# Patient Record
Sex: FEMALE | Race: WHITE | Employment: UNEMPLOYED | URBAN - METROPOLITAN AREA
[De-identification: names, ages, dates, MRNs, and addresses within clinical notes are randomized per-mention and may not be internally consistent; named-entity substitution may affect disease eponyms.]

---

## 2017-04-01 ENCOUNTER — HOSPITAL ENCOUNTER (OUTPATIENT)
Dept: MAMMOGRAPHY | Age: 62
Discharge: HOME | End: 2017-04-01
Attending: INTERNAL MEDICINE
Payer: COMMERCIAL

## 2017-04-01 DIAGNOSIS — Z12.31 ENCOUNTER FOR SCREENING MAMMOGRAM FOR BREAST CANCER: ICD-10-CM

## 2017-04-01 PROCEDURE — 77067 SCR MAMMO BI INCL CAD: CPT

## 2018-06-09 ENCOUNTER — HOSPITAL ENCOUNTER (OUTPATIENT)
Dept: MAMMOGRAPHY | Age: 63
Discharge: HOME | End: 2018-06-09
Attending: INTERNAL MEDICINE
Payer: COMMERCIAL

## 2018-06-09 DIAGNOSIS — Z12.31 VISIT FOR SCREENING MAMMOGRAM: ICD-10-CM

## 2018-06-09 PROCEDURE — 77067 SCR MAMMO BI INCL CAD: CPT

## 2019-07-16 ENCOUNTER — HOSPITAL ENCOUNTER (OUTPATIENT)
Dept: MAMMOGRAPHY | Age: 64
Discharge: HOME | End: 2019-07-16
Attending: INTERNAL MEDICINE
Payer: COMMERCIAL

## 2019-07-16 DIAGNOSIS — Z12.39 BREAST SCREENING, UNSPECIFIED: ICD-10-CM

## 2019-07-16 PROCEDURE — 77063 BREAST TOMOSYNTHESIS BI: CPT

## 2019-09-25 ENCOUNTER — HOSPITAL ENCOUNTER (OUTPATIENT)
Dept: LAB | Age: 64
Discharge: HOME | End: 2019-09-25
Payer: COMMERCIAL

## 2019-09-25 LAB — TSH SERPL DL<=0.005 MIU/L-ACNC: 3.07 UIU/ML (ref 0.36–3.74)

## 2019-09-25 PROCEDURE — 84443 ASSAY THYROID STIM HORMONE: CPT

## 2019-10-02 PROBLEM — E66.01 OBESITY, MORBID (HCC): Status: ACTIVE | Noted: 2019-10-02

## 2019-11-05 ENCOUNTER — ANESTHESIA (OUTPATIENT)
Dept: ENDOSCOPY | Age: 64
End: 2019-11-05
Payer: COMMERCIAL

## 2019-11-05 ENCOUNTER — ANESTHESIA EVENT (OUTPATIENT)
Dept: ENDOSCOPY | Age: 64
End: 2019-11-05
Payer: COMMERCIAL

## 2019-11-05 ENCOUNTER — HOSPITAL ENCOUNTER (OUTPATIENT)
Age: 64
Setting detail: OUTPATIENT SURGERY
Discharge: HOME | End: 2019-11-05
Attending: INTERNAL MEDICINE | Admitting: INTERNAL MEDICINE
Payer: COMMERCIAL

## 2019-11-05 VITALS
BODY MASS INDEX: 50.03 KG/M2 | HEART RATE: 58 BPM | TEMPERATURE: 97 F | SYSTOLIC BLOOD PRESSURE: 146 MMHG | OXYGEN SATURATION: 99 % | HEIGHT: 60 IN | DIASTOLIC BLOOD PRESSURE: 64 MMHG | WEIGHT: 254.8 LBS | RESPIRATION RATE: 16 BRPM

## 2019-11-05 PROCEDURE — 76060000032 HC ANESTHESIA 0.5 TO 1 HR: Performed by: INTERNAL MEDICINE

## 2019-11-05 PROCEDURE — 77030013991 HC SNR POLYP ENDOSC BSC -A: Performed by: INTERNAL MEDICINE

## 2019-11-05 PROCEDURE — 88305 TISSUE EXAM BY PATHOLOGIST: CPT

## 2019-11-05 PROCEDURE — 76040000025: Performed by: INTERNAL MEDICINE

## 2019-11-05 PROCEDURE — 74011000250 HC RX REV CODE- 250: Performed by: NURSE ANESTHETIST, CERTIFIED REGISTERED

## 2019-11-05 PROCEDURE — 77030010936 HC CLP LIG BSC -C: Performed by: INTERNAL MEDICINE

## 2019-11-05 PROCEDURE — 74011250636 HC RX REV CODE- 250/636: Performed by: INTERNAL MEDICINE

## 2019-11-05 PROCEDURE — 74011250636 HC RX REV CODE- 250/636: Performed by: NURSE ANESTHETIST, CERTIFIED REGISTERED

## 2019-11-05 RX ORDER — SODIUM CHLORIDE 0.9 % (FLUSH) 0.9 %
5-40 SYRINGE (ML) INJECTION EVERY 8 HOURS
Status: DISCONTINUED | OUTPATIENT
Start: 2019-11-05 | End: 2019-11-05 | Stop reason: HOSPADM

## 2019-11-05 RX ORDER — SODIUM CHLORIDE 0.9 % (FLUSH) 0.9 %
5-40 SYRINGE (ML) INJECTION AS NEEDED
Status: DISCONTINUED | OUTPATIENT
Start: 2019-11-05 | End: 2019-11-05 | Stop reason: HOSPADM

## 2019-11-05 RX ORDER — LIDOCAINE HYDROCHLORIDE 20 MG/ML
INJECTION, SOLUTION EPIDURAL; INFILTRATION; INTRACAUDAL; PERINEURAL AS NEEDED
Status: DISCONTINUED | OUTPATIENT
Start: 2019-11-05 | End: 2019-11-05 | Stop reason: HOSPADM

## 2019-11-05 RX ORDER — PROPOFOL 10 MG/ML
INJECTION, EMULSION INTRAVENOUS AS NEEDED
Status: DISCONTINUED | OUTPATIENT
Start: 2019-11-05 | End: 2019-11-05 | Stop reason: HOSPADM

## 2019-11-05 RX ORDER — SODIUM CHLORIDE, SODIUM LACTATE, POTASSIUM CHLORIDE, CALCIUM CHLORIDE 600; 310; 30; 20 MG/100ML; MG/100ML; MG/100ML; MG/100ML
1000 INJECTION, SOLUTION INTRAVENOUS CONTINUOUS
Status: DISCONTINUED | OUTPATIENT
Start: 2019-11-05 | End: 2019-11-05 | Stop reason: HOSPADM

## 2019-11-05 RX ORDER — PROPOFOL 10 MG/ML
INJECTION, EMULSION INTRAVENOUS
Status: DISCONTINUED | OUTPATIENT
Start: 2019-11-05 | End: 2019-11-05 | Stop reason: HOSPADM

## 2019-11-05 RX ORDER — LIDOCAINE HCL/PF 100 MG/5ML
SYRINGE (ML) INTRAVENOUS
Status: DISCONTINUED
Start: 2019-11-05 | End: 2019-11-05 | Stop reason: HOSPADM

## 2019-11-05 RX ADMIN — PROPOFOL 200 MCG/KG/MIN: 10 INJECTION, EMULSION INTRAVENOUS at 13:44

## 2019-11-05 RX ADMIN — LIDOCAINE HYDROCHLORIDE 100 MG: 20 INJECTION, SOLUTION EPIDURAL; INFILTRATION; INTRACAUDAL; PERINEURAL at 13:44

## 2019-11-05 RX ADMIN — PROPOFOL 70 MG: 10 INJECTION, EMULSION INTRAVENOUS at 13:44

## 2019-11-05 RX ADMIN — PROPOFOL 20 MG: 10 INJECTION, EMULSION INTRAVENOUS at 13:48

## 2019-11-05 RX ADMIN — SODIUM CHLORIDE, SODIUM LACTATE, POTASSIUM CHLORIDE, AND CALCIUM CHLORIDE 1000 ML: 600; 310; 30; 20 INJECTION, SOLUTION INTRAVENOUS at 13:35

## 2019-11-05 NOTE — PROCEDURES
COLONOSCOPY    DATE of PROCEDURE: 11/5/2019    MEDICATION:  MAC      INSTRUMENT: PCF    PROCEDURE: After obtaining informed consent, the patient was placed in the left lateral position and sedated. The endoscope was advanced to the cecum where the appendiceal orifice and ileocecal valve were identified. On withdrawal, the colon was carefully visualized in a 360 degree fashion. Retroflexion was performed in the rectum. The patient was taken to the recovery area in stable condition. FINDINGS:  Anus: Unremarkable  Rectum: Internal hemorrhoids, otherwise unremarkable  Sigmoid: Unremarkable  Descending Colon:  Unremarkable  Transverse Colon: Single ~7mm polyp on fold, removed with hot snare. Resection and retrieval were complete. Small mucosal defect noted, closed with resolution clip. Otherwise, unremarkable  Ascending Colon: Unremarkable  Cecum: Unremarkable  Terminal ileum: Unremarkable    Estimated blood loss: 0-minimal     ASSESSMENT/PLAN:  1. Repeat exam in 3 years  2. Follow up in office prn  3. Call office in 10 days for pathology report.        Gayl Nyhan, MD  [facility], [place]

## 2019-11-05 NOTE — DISCHARGE INSTRUCTIONS
Gastrointestinal Colonoscopy/Flexible Sigmoidoscopy - Lower Exam Discharge Instructions  1. Call Dr. Timothy Young at [phone] for any problems or questions. 2. Contact the doctors office for follow up appointment as directed  3. Medication may cause drowsiness for several hours, therefore:  · Do not drive or operate machinery for reminder of the day. · No alcohol today. · Do not make any important or legal decisions for 24 hours. · Do not sign any legal documents for 24 hours. 4. Ordinarily, you may resume regular diet and activity after exam unless otherwise specified by your physician. 5. Because of air put into your colon during exam, you may experience some abdominal distension, relieved by the passage of gas, for several hours. 6. Contact your physician if you have any of the following:  · Excessive amount of bleeding - large amount when having a bowel movement. Occasional specks of blood with bowel movement would not be unusual.  · Severe abdominal pain  · Fever or Chills  7. Polyp Removal - follow these additional instructions  · Take Metamucil - 1 tablespoon in juice every morning for 3 days as needed to avoid constipation. · No Aspirin, Advil, Aleve, Nuprin, Ibuprofen, or medications that contain these drugs for 2 weeks. Any additional instructions:  Repeat colonoscopy in 3 years. Follow up on pathology. Instructions given to [address] and other family members.

## 2019-11-05 NOTE — ANESTHESIA POSTPROCEDURE EVALUATION
Procedure(s):  COLONOSCOPY  ENDOSCOPIC POLYPECTOMY  ENDOSCOPIC BANDING OR LIGATION. total IV anesthesia    Anesthesia Post Evaluation      Multimodal analgesia: multimodal analgesia not used between 6 hours prior to anesthesia start to PACU discharge  Patient location during evaluation: PACU  Patient participation: complete - patient participated  Level of consciousness: awake and alert  Pain management: adequate  Airway patency: patent  Anesthetic complications: no  Cardiovascular status: hemodynamically stable  Respiratory status: acceptable  Hydration status: acceptable        Vitals Value Taken Time   /64 11/5/2019  2:40 PM   Temp 36.1 °C (97 °F) 11/5/2019  2:11 PM   Pulse 64 11/5/2019  2:42 PM   Resp 16 11/5/2019  2:40 PM   SpO2 100 % 11/5/2019  2:42 PM   Vitals shown include unvalidated device data.

## 2019-11-05 NOTE — ROUTINE PROCESS
VSS. No complaints noted at this time. Education given and reviewed with . Pt discharged via wheelchair by Bob Montiel RN.

## 2019-11-05 NOTE — ANESTHESIA PREPROCEDURE EVALUATION
Anesthetic History               Review of Systems / Medical History      Pulmonary            Asthma        Neuro/Psych         Psychiatric history     Cardiovascular    Hypertension: well controlled                   GI/Hepatic/Renal     GERD: well controlled           Endo/Other        Obesity and arthritis     Other Findings              Physical Exam    Airway  Mallampati: II  TM Distance: > 6 cm  Neck ROM: normal range of motion   Mouth opening: Normal     Cardiovascular  Regular rate and rhythm,  S1 and S2 normal,  no murmur, click, rub, or gallop  Rhythm: regular  Rate: normal         Dental  No notable dental hx       Pulmonary  Breath sounds clear to auscultation               Abdominal         Other Findings            Anesthetic Plan    ASA: 3  Anesthesia type: total IV anesthesia            Anesthetic plan and risks discussed with: Patient

## 2019-11-05 NOTE — H&P
History and Physical for Colonoscopy             Date: 2019     History of Present Illness:  Patient presents to undergo colonoscopy for history of polyps as well as family history of colon cancer.         Past Medical History:   Diagnosis Date    Arthritis     osteo    Asthma     Back pain 12/3/2012    Claustrophobia     Depression with anxiety     under control with med    Elevated liver function tests 2015    GERD (gastroesophageal reflux disease)     controlled with medication    Hyperlipidemia 7/6/15    no meds- resolved presently -     Hypertension     controlled with medication    Hypothyroidism     controlled with Synthroid    Hypothyroidism, adult 2015    Menopause     Morbid obesity ([address] 7/6/15    BMI- 40    Oral ulcer 12/3/2012    Osteoarthritis 12/3/2012    Psychiatric disorder     depression, anxiety,     Sciatica 12/3/2012    Thyroid disease     hypothyroid    Vitamin D deficiency 2015     Past Surgical History:   Procedure Laterality Date    BIOPSY OF BREAST, INCISIONAL      right, benign    HX BREAST BIOPSY Right     BENIGN    HX  SECTION  1976, 1983    x 2    HX DILATION AND CURETTAGE  2019    HX ORTHOPAEDIC      right foot /Lt knee replace    HX ORTHOPAEDIC Left     rotator cuff    HX OTHER SURGICAL      neck lymph node removal, benign    HX OTHER SURGICAL      steroid injection       Family History   Problem Relation Age of Onset    Other Mother         Unspecified respiratory problems    Lung Disease Mother     Cancer Father         brain    Diabetes Maternal Grandmother     Cancer Maternal Grandmother         colon    Cancer Paternal Grandmother         colon     Social History     Tobacco Use    Smoking status: Never Smoker    Smokeless tobacco: Never Used   Substance Use Topics    Alcohol use: No        Allergies   Allergen Reactions    Latex Rash     3 episodes of mouth sores, after dental visit (latex gloves)    Singulair [Montelukast] Shortness of Breath     Wheezing    Adhesive Tape-Silicones Rash     Paper tape only    Celebrex [Celecoxib] Other (comments)     Fatigue    Pravastatin Swelling     \"I retained fluid\"     No current facility-administered medications for this encounter. Review of Systems:  A detailed 10 organ review of systems is obtained with pertinent positives as listed in the History of Present Illness. All others are negative. Objective:     Physical Exam:  Visit Vitals  LMP 06/27/2010      General:  Alert and oriented. Heart: Regular rate and rhythm  Lungs:  Clear to auscultation bilaterally  Abdomen: Soft, nontender, nondistended    Impression/Plan:     Proceed with colonoscopy as planned. I have discussed with the patient the technique, benefits, alternatives, and risks of these procedures, including medication reaction, immediate or delayed bleeding, or perforation of the gastrointestinal tract.      Signed By: Tracy Horta MD     November 5, 2019

## 2019-12-20 ENCOUNTER — HOSPITAL ENCOUNTER (OUTPATIENT)
Dept: GENERAL RADIOLOGY | Age: 64
Discharge: HOME | End: 2019-12-20
Attending: INTERNAL MEDICINE
Payer: COMMERCIAL

## 2019-12-20 DIAGNOSIS — R06.02 SOB (SHORTNESS OF BREATH): ICD-10-CM

## 2019-12-20 DIAGNOSIS — J45.909 UNCOMPLICATED ASTHMA, UNSPECIFIED ASTHMA SEVERITY, UNSPECIFIED WHETHER PERSISTENT: ICD-10-CM

## 2019-12-20 PROBLEM — J45.20 MILD INTERMITTENT ASTHMA WITHOUT COMPLICATION: Status: ACTIVE | Noted: 2019-12-20

## 2019-12-20 PROCEDURE — 71046 X-RAY EXAM CHEST 2 VIEWS: CPT

## 2020-06-14 RX ORDER — CEFAZOLIN SODIUM/WATER 2 G/20 ML
2 SYRINGE (ML) INTRAVENOUS ONCE
Status: CANCELLED | OUTPATIENT
Start: 2020-06-14 | End: 2020-06-14

## 2020-06-16 ENCOUNTER — HOSPITAL ENCOUNTER (OUTPATIENT)
Dept: PHYSICAL THERAPY | Age: 65
Discharge: HOME | End: 2020-06-16
Payer: COMMERCIAL

## 2020-06-16 ENCOUNTER — HOSPITAL ENCOUNTER (OUTPATIENT)
Dept: SURGERY | Age: 65
Discharge: HOME | End: 2020-06-16
Payer: COMMERCIAL

## 2020-06-16 VITALS
WEIGHT: 235 LBS | RESPIRATION RATE: 12 BRPM | TEMPERATURE: 98 F | OXYGEN SATURATION: 99 % | BODY MASS INDEX: 44.37 KG/M2 | HEART RATE: 63 BPM | DIASTOLIC BLOOD PRESSURE: 71 MMHG | HEIGHT: 61 IN | SYSTOLIC BLOOD PRESSURE: 127 MMHG

## 2020-06-16 LAB
ANION GAP SERPL CALC-SCNC: 6 MMOL/L (ref 7–16)
APTT PPP: 27.8 SEC (ref 24.3–35.4)
ATRIAL RATE: 61 BPM
BACTERIA SPEC CULT: NORMAL
BASOPHILS # BLD: 0.1 K/UL (ref 0–0.2)
BASOPHILS NFR BLD: 1 % (ref 0–2)
BUN SERPL-MCNC: 27 MG/DL (ref 8–23)
CALCIUM SERPL-MCNC: 8.9 MG/DL (ref 8.3–10.4)
CALCULATED P AXIS, ECG09: 48 DEGREES
CALCULATED R AXIS, ECG10: -3 DEGREES
CALCULATED T AXIS, ECG11: 23 DEGREES
CHLORIDE SERPL-SCNC: 105 MMOL/L (ref 98–107)
CO2 SERPL-SCNC: 28 MMOL/L (ref 21–32)
CREAT SERPL-MCNC: 1.08 MG/DL (ref 0.6–1)
DIAGNOSIS, 93000: NORMAL
DIFFERENTIAL METHOD BLD: ABNORMAL
EOSINOPHIL # BLD: 0.3 K/UL (ref 0–0.8)
EOSINOPHIL NFR BLD: 5 % (ref 0.5–7.8)
ERYTHROCYTE [DISTWIDTH] IN BLOOD BY AUTOMATED COUNT: 13.3 % (ref 11.9–14.6)
EST. AVERAGE GLUCOSE BLD GHB EST-MCNC: 114 MG/DL
GLUCOSE SERPL-MCNC: 99 MG/DL (ref 65–100)
HBA1C MFR BLD: 5.6 %
HCT VFR BLD AUTO: 38 % (ref 35.8–46.3)
HGB BLD-MCNC: 11.9 G/DL (ref 11.7–15.4)
IMM GRANULOCYTES # BLD AUTO: 0 K/UL (ref 0–0.5)
IMM GRANULOCYTES NFR BLD AUTO: 0 % (ref 0–5)
INR PPP: 1.1
LYMPHOCYTES # BLD: 1.8 K/UL (ref 0.5–4.6)
LYMPHOCYTES NFR BLD: 32 % (ref 13–44)
MCH RBC QN AUTO: 28.5 PG (ref 26.1–32.9)
MCHC RBC AUTO-ENTMCNC: 31.3 G/DL (ref 31.4–35)
MCV RBC AUTO: 91.1 FL (ref 79.6–97.8)
MONOCYTES # BLD: 0.5 K/UL (ref 0.1–1.3)
MONOCYTES NFR BLD: 8 % (ref 4–12)
NEUTS SEG # BLD: 3.2 K/UL (ref 1.7–8.2)
NEUTS SEG NFR BLD: 54 % (ref 43–78)
NRBC # BLD: 0 K/UL (ref 0–0.2)
P-R INTERVAL, ECG05: 170 MS
PLATELET # BLD AUTO: 275 K/UL (ref 150–450)
PMV BLD AUTO: 11.3 FL (ref 9.4–12.3)
POTASSIUM SERPL-SCNC: 3.7 MMOL/L (ref 3.5–5.1)
PROTHROMBIN TIME: 14.2 SEC (ref 12–14.7)
Q-T INTERVAL, ECG07: 422 MS
QRS DURATION, ECG06: 86 MS
QTC CALCULATION (BEZET), ECG08: 424 MS
RBC # BLD AUTO: 4.17 M/UL (ref 4.05–5.2)
SERVICE CMNT-IMP: NORMAL
SODIUM SERPL-SCNC: 139 MMOL/L (ref 136–145)
VENTRICULAR RATE, ECG03: 61 BPM
WBC # BLD AUTO: 5.9 K/UL (ref 4.3–11.1)

## 2020-06-16 PROCEDURE — 80048 BASIC METABOLIC PNL TOTAL CA: CPT

## 2020-06-16 PROCEDURE — 85730 THROMBOPLASTIN TIME PARTIAL: CPT

## 2020-06-16 PROCEDURE — 85610 PROTHROMBIN TIME: CPT

## 2020-06-16 PROCEDURE — 97161 PT EVAL LOW COMPLEX 20 MIN: CPT

## 2020-06-16 PROCEDURE — 85025 COMPLETE CBC W/AUTO DIFF WBC: CPT

## 2020-06-16 PROCEDURE — 77030027138 HC INCENT SPIROMETER -A

## 2020-06-16 PROCEDURE — 87641 MR-STAPH DNA AMP PROBE: CPT

## 2020-06-16 PROCEDURE — 36415 COLL VENOUS BLD VENIPUNCTURE: CPT

## 2020-06-16 PROCEDURE — 83036 HEMOGLOBIN GLYCOSYLATED A1C: CPT

## 2020-06-16 PROCEDURE — 77030012341 HC CHMB SPCR OPTC MDI VYRM -A

## 2020-06-16 NOTE — PERIOP NOTES
Lab results within anesthesia guidelines. MRSA swab result pending. All results routed/faxed to pt's PCP, Charis Dixon MD, per surgeon's order. Recent Results (from the past 12 hour(s))   CBC WITH AUTOMATED DIFF    Collection Time: 06/16/20  9:35 AM   Result Value Ref Range    WBC 5.9 4.3 - 11.1 K/uL    RBC 4.17 4.05 - 5.2 M/uL    HGB 11.9 11.7 - 15.4 g/dL    HCT 38.0 35.8 - 46.3 %    MCV 91.1 79.6 - 97.8 FL    MCH 28.5 26.1 - 32.9 PG    MCHC 31.3 (L) 31.4 - 35.0 g/dL    RDW 13.3 11.9 - 14.6 %    PLATELET 264 020 - 508 K/uL    MPV 11.3 9.4 - 12.3 FL    ABSOLUTE NRBC 0.00 0.0 - 0.2 K/uL    DF AUTOMATED      NEUTROPHILS 54 43 - 78 %    LYMPHOCYTES 32 13 - 44 %    MONOCYTES 8 4.0 - 12.0 %    EOSINOPHILS 5 0.5 - 7.8 %    BASOPHILS 1 0.0 - 2.0 %    IMMATURE GRANULOCYTES 0 0.0 - 5.0 %    ABS. NEUTROPHILS 3.2 1.7 - 8.2 K/UL    ABS. LYMPHOCYTES 1.8 0.5 - 4.6 K/UL    ABS. MONOCYTES 0.5 0.1 - 1.3 K/UL    ABS. EOSINOPHILS 0.3 0.0 - 0.8 K/UL    ABS. BASOPHILS 0.1 0.0 - 0.2 K/UL    ABS. IMM.  GRANS. 0.0 0.0 - 0.5 K/UL   PROTHROMBIN TIME + INR    Collection Time: 06/16/20  9:35 AM   Result Value Ref Range    Prothrombin time 14.2 12.0 - 14.7 sec    INR 1.1     PTT    Collection Time: 06/16/20  9:35 AM   Result Value Ref Range    aPTT 27.8 24.3 - 51.6 SEC   METABOLIC PANEL, BASIC    Collection Time: 06/16/20  9:35 AM   Result Value Ref Range    Sodium 139 136 - 145 mmol/L    Potassium 3.7 3.5 - 5.1 mmol/L    Chloride 105 98 - 107 mmol/L    CO2 28 21 - 32 mmol/L    Anion gap 6 (L) 7 - 16 mmol/L    Glucose 99 65 - 100 mg/dL    BUN 27 (H) 8 - 23 MG/DL    Creatinine 1.08 (H) 0.6 - 1.0 MG/DL    GFR est AA >60 >60 ml/min/1.73m2    GFR est non-AA 54 (L) >60 ml/min/1.73m2    Calcium 8.9 8.3 - 10.4 MG/DL   HEMOGLOBIN A1C WITH EAG    Collection Time: 06/16/20  9:36 AM   Result Value Ref Range    Hemoglobin A1c 5.6 %    Est. average glucose 114 mg/dL

## 2020-06-16 NOTE — PROGRESS NOTES
[facility] Case Management note:  Patient screened in Prehab for discharge planning needs. Patient scheduled for a future total joint replacement. We discussed Home Health and equipment needed after surgery. List of Home Health providers offered. Patient requesting [facility] Adhikari who she has used previously.

## 2020-06-16 NOTE — PERIOP NOTES
PLEASE CONTINUE TAKING ALL PRESCRIPTION MEDICATIONS UP TO THE DAY OF SURGERY UNLESS OTHERWISE DIRECTED BELOW. DISCONTINUE all vitamins and supplements 21 days prior to surgery. DISCONTINUE Non-Steriodal Anti-Inflammatory (NSAIDS) such as Advil and Aleve 5 days prior to surgery. Home Medications to take  the day of surgery   Famotidine (pepcid), flonase nasal spray, albuterol inhaler if needed,     sertraline (zoloft), levothyroxine (synthroid), eye drops, ear drops         Home Medications   to Hold   Diclofenac (voltaren) x 5 days prior to surgery    Phentermine (adipex) x 5 days prior to surgery      Comments   BRING: albuterol inhaler, ear drops      *Visitor policy of 1 visitor per patient discussed. Please do not bring home medications with you on the day of surgery unless otherwise directed by your nurse. If you are instructed to bring home medications, please give them to your nurse as they will be administered by the nursing staff. If you have any questions, please call [facility] [phone] or [facility] [phone]. A copy of this note was provided to the patient for reference.

## 2020-06-16 NOTE — PERIOP NOTES
Patient verified name and . Order for consent found in EHR and matches case posting; patient verified. Type 3 surgery, joint PAT assessment complete. Labs per surgeon: CBC, BMP, PT/PTT, Hgba1c- results within anesthesia guidelines  Labs per anesthesia protocol: no additional  EKG: done today- result needs anesthesia review     Pt with hx of ECHO done 2017- result in EMR for anesthesia reference. Pt had pulmonary clearance appt 2020- office note states: \"Pt to have knee replacement surgery. Dr Leonila Souza. Requests pulmonary pre op risk assessment. Given that her asthma is well controlled and surgery will be on LE, patient should be at low risk of any benson op pulmonary complications. Cont prn albuterol before surgery and administer it to her in the benson op period. \"     Patient informed a Covid swab is required 7 days prior to surgery. The testing center is located at the . [address] For questions or concerns the patient is advised to call [phone]. An appointment is required and the testing clinic is closed from  for lunch and on weekends. Appointment date/time 2020 at 1110 found in EHR and provided to patient. MRSA/MSSA swab collected; pharmacy to review and dose antibiotic as appropriate. Hospital approved surgical skin cleanser and instructions to return bottle on DOS given per hospital policy. Patient provided with handouts including Guide to Surgery, Pain Management, Hand Hygiene, Blood Transfusion Education, and [place] [facility] Brochure. Patient answered medical/surgical history questions at their best of ability. All prior to admission medications documented in [facility]. Original medication prescription bottles NOT visualized during patient appointment. Patient instructed to hold all vitamins 3 weeks prior to surgery and NSAIDS 5 days prior to surgery.      Patient teach back successful and patient demonstrates knowledge of instruction.

## 2020-06-16 NOTE — PROGRESS NOTES
[facility] Case Management note:  Patient screened in Prehab for discharge planning needs. Patient scheduled for a future total joint replacement. We discussed Home Health and equipment needed after surgery. List of Home Health providers offered. Patient requesting At[facility] Bowen who she has used previously. She will also need a walker arranged. Last walker obtained was seven years ago.

## 2020-06-16 NOTE — PROGRESS NOTES
06/16/20 0900   Oxygen Therapy   O2 Sat (%) 95 %   Pulse via Oximetry 82 beats per minute   O2 Device Room air   Pre-Treatment   Breath Sounds Bilateral Clear   Pre FEV1 (liters) 1.7 liters   % Predicted 80   Incentive Spirometry Treatment   Actual Volume (ml) 2250 ml     Initial respiratory Assessment completed with pt. Pt was interviewed and evaluated in [facility] prior to surgery. Patient ID:  Heavenly Salmon  [id number]  89 y.o.  1955  Surgeon: Dr. Ena Orosco  Date of Surgery: 7/8/2020  Procedure: Total Right Knee Arthroplasty  Primary Care Physician: Sean Quiroga [phone]  Specialists: [facility]                     Pt instructed in the use of Incentive Spirometry. Pt instructed to bring Incentive Spirometer back on date of surgery & to start using Is upon return to pt room. Written instructions given to patient.   Pt taught proper cough technique    History of smoking:   DENIES                 Quit date:         Secondhand smoke:PARENTS & SPOUSE    Past procedures with Oxygen desaturation or delayed awakening:DENIES    Past Medical History:   Diagnosis Date    Arthritis     osteo    Asthma     inhaler PRN- usually needs q 2 weeks; Dr Hallie José (pulm follows)     Autoimmune disease ([address] 2017    had consult with Dr Sandra Joya (rheum) 2017- no diagnosis- symptoms include fatigue, \"joints coming apart,\" dry eyes/mouth, tremors, positive EDILSON screen    Back pain 12/3/2012    Claustrophobia     Depression with anxiety     under control with med    Elevated liver function tests 04/29/2015    WNL 9/2019    GERD (gastroesophageal reflux disease)     managed with medication    Hyperlipidemia 07/06/2015    no meds- resolved presently    Hypertension     managed with medication    Hypothyroidism     managed with medication    Menopause     Morbid obesity ([address] 07/06/2015    BMI- 44    Oral ulcer 12/3/2012    Osteoarthritis 12/3/2012    Sciatica 12/3/2012    Vitamin D deficiency 12/30/2015      ASTHMA  Respiratory history:DENIES SOB                                                                  Respiratory meds: SYMBICORT & ALBUTEROL MDI  PT uses MDI PRN with PROAIR. MDI instructions given verbally & written along with spacer. Pt to use home MDI's morning of surgery & bring to [address]:             NO                                                   PAST SLEEP STUDY:                         DENIES-   HX OF PASTORA:                                      DENIES  PASTORA assessment:     Pt states she doesn't think she could wear cpap. Dangers of untreated PASTORA explained to pt. SLEEPS ON SIDE                                                    PHYSICAL EXAM   Body mass index is 44.4 kg/m².    Visit Vitals  /71 (BP 1 Location: Left arm, BP Patient Position: At rest;Sitting)   Pulse 63   Temp 98 °F (36.7 °C)   Resp 12   Ht 5' 1\" (1.549 m)   Wt 106.6 kg (235 lb)   SpO2 99%   BMI 44.40 kg/m²     Neck circumference:  44    cm    Loud snoring:                                                 YES             Witnessed apnea or wakening gasping or choking:        DENIES         Awakens with headaches:                                               DENIES  Morning or daytime tiredness/ sleepiness:                            TIRED  Dry mouth or sore throat in morning:            YES                                              Garsia stage:  4                                   SACS score:25  Stop Bang   STOP-BANG  Does the patient snore loudly (louder than talking or loud enough to be heard through closed doors)?: Yes  Does the patient often feel tired, fatigued, or sleepy during the daytime, even after a \"good\" night's sleep?: Yes  Has anyone ever observed the patient stop breathing during their sleep? : No  Does the patient have or are they being treated for high blood pressure?: Yes  Is the patient's BMI greater than 35?: Yes  Is your neck circumference greater than 17 inches (Male) or 16 inches (Female)?: Yes  Is the patient older than 48?: Yes  Is the patient male?: No  PASTORA Score: 6  Has the patient been referred to Sleep Medicine?: No  Has the patient previously been diagnosed with Obstructive Sleep Apnea?: No                            CS HS  O2 AT 3 HS  ALBUTEROL Q 6 PRN  PEP THERAPY QID                                          Referrals:    Pt. Phone Number:

## 2020-06-16 NOTE — PROGRESS NOTES
Shi Hennessy  : [birth year](24 y.o.) [facility] [place] [address], [address], [place]  [phone]       Physical Therapy Prehab Plan of Treatment and Evaluation Summary:2020    ICD-10: Treatment Diagnosis:   · Pain in Right Knee (M25.561)  · Stiffness of Right Knee, Not elsewhere classified (M25.661)  · Difficulty in walking, Not elsewhere classified (R26.2)  · Other abnormalities of gait and mobility (R26.89)  Precautions/Allergies:   Latex; Singulair [montelukast]; Adhesive tape-silicones; Celebrex [celecoxib]; Ketamine; and Pravastatin  MEDICAL/REFERRING DIAGNOSIS:  Unilateral primary osteoarthritis, right knee [M17.11]  REFERRING PHYSICIAN: Saloni Macedo MD  DATE OF SURGERY: 20    Assessment:   Comments:  Pain in right knee joint with decreased independence with functional mobility. Pt plans to return home following hospital stay. Pt had left tka in 2012. Pt states current surgery initially scheduled for . Pt motivated and prepared for surgery. PROBLEM LIST (Impacting functional limitations):  Ms. Zach Padron presents with the following right lower extremity(s) problems:  1. Transfers  2. Gait  3. Strength  4. Range of Motion  5. Pain   INTERVENTIONS PLANNED:  1. Home Exercise Program  2. Educational Discussion      TREATMENT PLAN: Effective Dates: 2020 TO 2020. Frequency/Duration: Patient to continue to perform home exercise program at least twice per day up until her surgery. GOALS: (Goals have been discussed and agreed upon with patient.)  Discharge Goals: Time Frame: 1 Day  1. Patient will demonstrate independence with a home exercise program designed to increase strength, range of motion and pain control to minimize functional deficits and optimize patient for total joint replacement.   Rehabilitation Potential For Stated Goals: Good  Regarding Mindy Mcdonald's therapy, I certify that the treatment plan above will be carried out by a therapist or under their direction. Thank you for this referral,  Wu Torre, PT               HISTORY:   Present Symptoms:  Pain Intensity 1: 0  Pain Location 1: Knee  Pain Orientation 1: Right   History of Present Injury/Illness (Reason for Referral):  Medical/Referring Diagnosis: Unilateral primary osteoarthritis, right knee [M17.11]   Past Medical History/Comorbidities:   Ms. Vida Lambert  has a past medical history of Arthritis, Asthma, Autoimmune disease ([facility] [address] (), Back pain (12/3/2012), Claustrophobia, Depression with anxiety, Elevated liver function tests (2015), GERD (gastroesophageal reflux disease), Hyperlipidemia (2015), Hypertension, Hypothyroidism, Menopause, Morbid obesity ([facility] [address] (2015), Oral ulcer (12/3/2012), Osteoarthritis (12/3/2012), Sciatica (12/3/2012), and Vitamin D deficiency (2015). She also has no past medical history of Unspecified adverse effect of anesthesia. Ms. Vida Lambert  has a past surgical history that includes hx orthopaedic (Right, 2012); hx breast biopsy (Right, ); hx  section (, ); hx dilation and curettage (2019); colonoscopy (N/A, 2019); hx lymph node dissection (); hx rotator cuff repair (Left, ); hx knee replacement (Left, 2012); and ir inj epidural cerv/thor steroid wo img. Social History/Living Environment:   Home Environment: Private residence  # Steps to Enter: 2  One/Two Story Residence: One story  Living Alone: No  Support Systems: Spouse/Significant Other/Partner  Patient Expects to be Discharged to[de-identified] Private residence  Current DME Used/Available at Home: None  Tub or Shower Type: Shower  Work/Activity:  Has not worked in 37 years. 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   Dominant Side:  RIGHT  Current Medications:  See Pre-assessment nursing note   Number of Personal Factors/Comorbidities that affect the Plan of Care: 0: LOW COMPLEXITY   EXAMINATION:   ADLs (Current Functional Status): Ambulation:  [x] Independent  [] Walk Indoors Only  [] Walk Outdoors  [] Use Assistive Device  [] Use Wheelchair Only Dressing:  [x] Independent  Requires Assistance from Someone for:  [] Sock/Shoes  [] Pants  [] Everything   Bathing/Showering:   [x] Independent  [] Requires Assistance from Someone  [] Sponge Bath Only Household Activities:  [] Routine house and yard work  [x] Light Housework Only  [] None   Observation/Orthostatic Postural Assessment:   Within defined limits   ROM/Flexibility:   Gross Assessment: Yes  AROM: Generally decreased, functional                LLE Assessment  LLE Assessment (WDL): Within defined limits      RLE AROM  R Knee Flexion: 100  R Knee Extension: 10   Strength:   Gross Assessment: Yes  Strength: Generally decreased, functional                  Functional Mobility:    Gross Assessment: Yes  Coordination: Generally decreased, functional    Gait Description (WDL): Within defined limits  Stand to Sit: Independent  Sit to Stand: Independent  Distance (ft): 250 Feet (ft)  Ambulation - Level of Assistance: Independent          Balance:    Sitting: Intact  Standing: Intact   Body Structures Involved:  1. Bones  2. Joints  3. Muscles  4. Ligaments Body Functions Affected:  1. Neuromusculoskeletal  2. Movement Related Activities and Participation Affected:  1. Mobility   Number of elements that affect the Plan of Care: 4+: HIGH COMPLEXITY   CLINICAL PRESENTATION:   Presentation: Stable and uncomplicated: LOW COMPLEXITY   CLINICAL DECISION MAKING:   Outcome Measure: Tool Used: Lower Extremity Functional Scale (LEFS)  Score:  Initial: 26/80 Most Recent: X/80 (Date: -- )   Interpretation of Score: 20 questions each scored on a 5 point scale with 0 representing \"extreme difficulty or unable to perform\" and 4 representing \"no difficulty\". The lower the score, the greater the functional disability. 80/80 represents no disability. Minimal detectable change is 9 points.     Medical Necessity: · Ms. Jose Flaherty is expected to optimize her lower extremity strength and ROM in preparation for joint replacement surgery. Reason for Services/Other Comments:  · Achieve baseline assesment of musculoskeletal system, functional mobility and home environment. , educate in PT HEP in preparation for surgery, educate in hospital plan of care. Use of outcome tool(s) and clinical judgement create a POC that gives a: Clear prediction of patient's progress: LOW COMPLEXITY   TREATMENT:   Treatment/Session Assessment:  Patient was instructed in PT- HEP to increase strength and ROM in LEs. Answered all questions. · Post session pain:  0 sitting  · Compliance with Program/Exercises: compliant most of the time.   Total Treatment Duration:  PT Patient Time In/Time Out  Time In: 0945  Time Out: 16 Juanito Lou, PT

## 2020-06-17 PROBLEM — R29.818 SUSPECTED SLEEP APNEA: Status: ACTIVE | Noted: 2020-06-17

## 2020-06-17 RX ORDER — ALBUTEROL SULFATE 0.83 MG/ML
2.5 SOLUTION RESPIRATORY (INHALATION)
Status: CANCELLED | OUTPATIENT
Start: 2020-06-17

## 2020-06-17 NOTE — ADVANCED PRACTICE NURSE
Total Joint Surgery Preoperative Chart Review      Patient ID:  Jesus Baer  [id number]  89 y.o.  1955  Surgeon: Dr. Leighton Jackson  Date of Surgery: 7/8/2020  Procedure: Total Right Knee Arthroplasty  Primary Care Physician: Mary Ferraro [phone]  Specialty Physician(s):      Subjective:   Jesus Baer is a 60 y.o. WHITE OR  female who presents for preoperative evaluation for Total Right Knee arthroplasty. This is a preoperative chart review note based on data collected by the nurse at the surgical Pre-Assessment visit.     Past Medical History:   Diagnosis Date    Arthritis     osteo    Asthma     inhaler PRN- usually needs q 2 weeks; Dr Viktor Hendricks (pulm follows)     Autoimmune disease ([facility] [address] 2017    had consult with Dr Sheldon Carrion (rheum) 2017- no diagnosis- symptoms include fatigue, \"joints coming apart,\" dry eyes/mouth, tremors, positive EDILSON screen    Back pain 12/3/2012    Claustrophobia     Depression with anxiety     under control with med    Elevated liver function tests 04/29/2015    WNL 9/2019    GERD (gastroesophageal reflux disease)     managed with medication    Hyperlipidemia 07/06/2015    no meds- resolved presently    Hypertension     managed with medication    Hypothyroidism     managed with medication    Menopause     Morbid obesity ([facility] [address] 07/06/2015    BMI- 44    Oral ulcer 12/3/2012    Osteoarthritis 12/3/2012    Sciatica 12/3/2012    Vitamin D deficiency 12/30/2015      Past Surgical History:   Procedure Laterality Date    COLONOSCOPY N/A 11/5/2019    COLONOSCOPY performed by Hamilton Bocanegra MD at [facility] [address] [facility]    x 2    HX DILATION AND CURETTAGE  08/23/2019    HX KNEE REPLACEMENT Left 07/2012    HX LYMPH NODE DISSECTION  1990    neck- benign    HX ORTHOPAEDIC Right 07/2012    foot- \"toe fusion\"    HX ROTATOR CUFF REPAIR Left 2014    IR INJ EPIDURAL CERV/THOR STEROID steroid injection      Family History   Problem Relation Age of Onset    Other Mother         Unspecified respiratory problems    Lung Disease Mother     Cancer Father         brain    Diabetes Maternal Grandmother     Cancer Maternal Grandmother         colon    Cancer Paternal Grandmother         colon      Social History     Tobacco Use    Smoking status: Never Smoker    Smokeless tobacco: Never Used   Substance Use Topics    Alcohol use: No       Prior to Admission medications    Medication Sig Start Date End Date Taking? Authorizing Provider   polysorbate 80/glycerin (REFRESH DRY EYE THERAPY OP) Apply  to eye as needed. Yes Provider, Historical   albuterol (ProAir HFA) 90 mcg/actuation inhaler Take 2 Puffs by inhalation every four (4) hours as needed for Wheezing. 5/1/20  Yes Karoline Patel MD   diclofenac EC (VOLTAREN) 75 mg EC tablet Take 75 mg by mouth two (2) times a day. Yes Provider, Historical   fluocinolone acetonide oiL 0.01 % drop 3 Drops by Otic route daily. 4/21/20  Yes Anuj Rawls MD   mupirocin (Bactroban) 2 % ointment Apply  to affected area two (2) times a day. Patient taking differently: Apply  to affected area as needed. 4/21/20  Yes Anuj Rawls MD   phentermine (Adipex-P) 37.5 mg tablet Take 37.5 mg by mouth every morning. Yes Provider, Historical   sertraline (ZOLOFT) 50 mg tablet TAKE 1 TABLET BY MOUTH  DAILY 2/24/20  Yes Anuj Rawls MD   potassium chloride (K-DUR, KLOR-CON) 20 mEq tablet TAKE 1 TABLET BY MOUTH  EVERY MORNING 2/24/20  Yes Anuj Rawls MD   levothyroxine (SYNTHROID) 88 mcg tablet TAKE 1 TABLET BY MOUTH  DAILY 2/24/20  Yes Anuj Rawls MD   telmisartan (MICARDIS) 80 mg tablet Take 1 Tab by mouth daily. 12/12/19  Yes Anuj Rawls MD   hydroCHLOROthiazide (HYDRODIURIL) 12.5 mg tablet Take 1 Tab by mouth daily. 12/12/19  Yes Anuj Rawls MD   famotidine (PEPCID) 20 mg tablet Take 1 Tab by mouth daily.   Patient taking differently: Take 40 mg by mouth daily. 10/2/19  Yes Adri Zavala MD   garsonia 2,873 mg cap Take  by mouth daily. Yes Provider, Historical   fluticasone (FLONASE) 50 mcg/actuation nasal spray 2 Sprays by Both Nostrils route daily. Yes Provider, Historical   cholecalciferol, VITAMIN D3, (VITAMIN D3) 5,000 unit tab tablet Take 1 Tab by mouth daily. 7/11/16  Yes Adri Zavala MD     Allergies   Allergen Reactions    Latex Rash     3 episodes of mouth sores, after dental visit (latex gloves)    Singulair [Montelukast] Shortness of Breath     Wheezing    Adhesive Tape-Silicones Rash     Paper tape only    Celebrex [Celecoxib] Other (comments)     Fatigue    Ketamine Other (comments)     \"out of body experience, hallucinations\"    Pravastatin Swelling     \"I retained fluid\"          Objective:     Physical Exam:   No data found.     ECG:    EKG Results     Procedure 720 Value Units Date/Time    EKG, 12 LEAD, INITIAL [[id number]] Collected:  06/16/20 1020    Order Status:  Completed Updated:  06/16/20 1237     Ventricular Rate 61 BPM      Atrial Rate 61 BPM      P-R Interval 170 ms      QRS Duration 86 ms      Q-T Interval 422 ms      QTC Calculation (Bezet) 424 ms      Calculated P Axis 48 degrees      Calculated R Axis -3 degrees      Calculated T Axis 23 degrees      Diagnosis --     Normal sinus rhythm  Low voltage QRS  Possible Anterolateral infarct , age undetermined  Abnormal ECG  No previous ECGs available  Confirmed by RALPH YOUNG (), Tori Dover ([id number]) on 6/16/2020 12:37:17 PM            Data Review:   Labs:   Labs reviewed    Problem List:  )  Patient Active Problem List   Diagnosis Code    Osteoarthritis of left knee M17.12    S/P total knee replacement using cement Z96.659    HTN (hypertension) I10    Anxiety F41.9    Hyperlipidemia E78.5    Hypertension I10    Bronchitis J40    Sicca syndrome (HCC) M35.00    Hypothyroidism E03.9    GERD (gastroesophageal reflux disease) K21.9    Back pain M54.9    Sciatica M54.30    Osteoarthritis M19.90    Knee joint replacement status Z96.659    Hair loss L65.9    Encounter for long-term (current) use of other medications Z79.899    Snoring R06.83    Fatigue R53.83    Sleep disorder G47.9    Obesity E66.9    Allergic rhinitis J30.9    Hypersomnolence G47.10    Asthma J45.909    Cholecystitis with cholelithiasis K80.10    Cervical radicular pain M54.12    Elevated liver function tests R79.89    Hypothyroidism, adult E03.9    Vitamin D deficiency E55.9    Obesity, morbid (HCC) E66.01    Mild intermittent asthma without complication T15.60    Suspected sleep apnea R29.818       Total Joint Surgery Pre-Assessment Recommendations:           Suspected sleep apnea with BMI 44. May need CPAP in PACU. Recommend oxygen saturation monitoring during hospitalization and oxygen at 3 liter via nc qhs. PEP therapy QID  Albuterol every 6 hours as need during hospitalization.        Signed By: RANDALL Jenkins    June 17, 2020

## 2020-07-07 ENCOUNTER — ANESTHESIA EVENT (OUTPATIENT)
Dept: SURGERY | Age: 65
DRG: 470 | End: 2020-07-07
Payer: COMMERCIAL

## 2020-07-07 RX ORDER — SODIUM CHLORIDE 9 MG/ML
50 INJECTION, SOLUTION INTRAVENOUS CONTINUOUS
Status: CANCELLED | OUTPATIENT
Start: 2020-07-07 | End: 2020-07-08

## 2020-07-07 NOTE — H&P
[address] [facility]  Pre Operative History and Physical Exam    Patient ID:  Yahaira Field  [id number]  18 y.o.  1955    Today: July 7, 2020           CC:  Right knee pain    HPI:   The patient has end stage arthritis of the right knee. The patient was evaluated and examined during a consultation prior to this office visit. There have been no changes to the patient's orthopedic condition since the initial consultation. The patient has failed previous conservative treatment for this condition including antiinflammatories , and lifestyle modifications. The necessity for joint replacement is present.  The patient will be admitted the day of surgery for Procedure(s) (LRB):  RIGHT KNEE ARTHROPLASTY TOTAL ROBOTIC ASSISTED / HIEN/USAMA / [address] (Right)      Past Medical/Surgical History:  Past Medical History:   Diagnosis Date    Arthritis     osteo    Asthma     inhaler PRN- usually needs q 2 weeks; Dr Mark Marrufo (pulm follows)     Autoimmune disease ([facility] [address] 2017    had consult with Dr Andrew Molina (rheum) 2017- no diagnosis- symptoms include fatigue, \"joints coming apart,\" dry eyes/mouth, tremors, positive EDILSON screen    Back pain 12/3/2012    Claustrophobia     Depression with anxiety     under control with med    Elevated liver function tests 04/29/2015    WNL 9/2019    GERD (gastroesophageal reflux disease)     managed with medication    Hyperlipidemia 07/06/2015    no meds- resolved presently    Hypertension     managed with medication    Hypothyroidism     managed with medication    Menopause     Morbid obesity ([facility] [address] 07/06/2015    BMI- 44    Oral ulcer 12/3/2012    Osteoarthritis 12/3/2012    Sciatica 12/3/2012    Vitamin D deficiency 12/30/2015     Past Surgical History:   Procedure Laterality Date    COLONOSCOPY N/A 11/5/2019    COLONOSCOPY performed by Jett Nichols MD at [facility] [address] [facility]    x 2    HX DILATION AND CURETTAGE  08/23/2019    HX KNEE REPLACEMENT Left 07/2012    HX LYMPH NODE DISSECTION  1990    neck- benign    HX ORTHOPAEDIC Right 07/2012    foot- \"toe fusion\"    HX ROTATOR CUFF REPAIR Left 2014    IR INJ EPIDURAL CERV/THOR STEROID      steroid injection         Allergies: Allergies   Allergen Reactions    Latex Rash     3 episodes of mouth sores, after dental visit (latex gloves)    Singulair [Montelukast] Shortness of Breath     Wheezing    Adhesive Tape-Silicones Rash     Paper tape only    Celebrex [Celecoxib] Other (comments)     Fatigue    Ketamine Other (comments)     \"out of body experience, hallucinations\"    Pravastatin Swelling     \"I retained fluid\"        Physical Exam:   General: NAD, Alert, Oriented, Appears their stated age     HEENT: NC/AT, PERRL    Skin: No rashes, lesions or wounds seen      Psych: normal affect      Heart: Regular Rate, Rhythm     Lungs: unlabored respirations, normal breath sounds     Abdomen: Soft and non-distended     Ortho: Pain with limited ROM of the right knee    Neuro: no focal defects, sensation is equal bilaterally     Lymph: no lymphadenopathy     Meds:   No current facility-administered medications for this encounter. Current Outpatient Medications   Medication Sig    polysorbate 80/glycerin (REFRESH DRY EYE THERAPY OP) Apply  to eye as needed.  albuterol (ProAir HFA) 90 mcg/actuation inhaler Take 2 Puffs by inhalation every four (4) hours as needed for Wheezing.  diclofenac EC (VOLTAREN) 75 mg EC tablet Take 75 mg by mouth two (2) times a day.  fluocinolone acetonide oiL 0.01 % drop 3 Drops by Otic route daily.  mupirocin (Bactroban) 2 % ointment Apply  to affected area two (2) times a day. (Patient taking differently: Apply  to affected area as needed.)    phentermine (Adipex-P) 37.5 mg tablet Take 37.5 mg by mouth every morning.     sertraline (ZOLOFT) 50 mg tablet TAKE 1 TABLET BY MOUTH  DAILY    potassium chloride (K-DUR, KLOR-CON) 20 mEq tablet TAKE 1 TABLET BY MOUTH  EVERY MORNING    levothyroxine (SYNTHROID) 88 mcg tablet TAKE 1 TABLET BY MOUTH  DAILY    telmisartan (MICARDIS) 80 mg tablet Take 1 Tab by mouth daily.  hydroCHLOROthiazide (HYDRODIURIL) 12.5 mg tablet Take 1 Tab by mouth daily.  famotidine (PEPCID) 20 mg tablet Take 1 Tab by mouth daily. (Patient taking differently: Take 40 mg by mouth daily.)    garlic 2,789 mg cap Take  by mouth daily.  fluticasone (FLONASE) 50 mcg/actuation nasal spray 2 Sprays by Both Nostrils route daily.  cholecalciferol, VITAMIN D3, (VITAMIN D3) 5,000 unit tab tablet Take 1 Tab by mouth daily. Labs:  Hospital Outpatient Visit on 06/16/2020   Component Date Value Ref Range Status    WBC 06/16/2020 5.9  4.3 - 11.1 K/uL Final    RBC 06/16/2020 4.17  4.05 - 5.2 M/uL Final    HGB 06/16/2020 11.9  11.7 - 15.4 g/dL Final    HCT 06/16/2020 38.0  35.8 - 46.3 % Final    MCV 06/16/2020 91.1  79.6 - 97.8 FL Final    MCH 06/16/2020 28.5  26.1 - 32.9 PG Final    MCHC 06/16/2020 31.3* 31.4 - 35.0 g/dL Final    RDW 06/16/2020 13.3  11.9 - 14.6 % Final    PLATELET 47/82/4253 116  150 - 450 K/uL Final    MPV 06/16/2020 11.3  9.4 - 12.3 FL Final    ABSOLUTE NRBC 06/16/2020 0.00  0.0 - 0.2 K/uL Final    **Note: Absolute NRBC parameter is now reported with Hemogram**    DF 06/16/2020 AUTOMATED    Final    NEUTROPHILS 06/16/2020 54  43 - 78 % Final    LYMPHOCYTES 06/16/2020 32  13 - 44 % Final    MONOCYTES 06/16/2020 8  4.0 - 12.0 % Final    EOSINOPHILS 06/16/2020 5  0.5 - 7.8 % Final    BASOPHILS 06/16/2020 1  0.0 - 2.0 % Final    IMMATURE GRANULOCYTES 06/16/2020 0  0.0 - 5.0 % Final    ABS. NEUTROPHILS 06/16/2020 3.2  1.7 - 8.2 K/UL Final    ABS. LYMPHOCYTES 06/16/2020 1.8  0.5 - 4.6 K/UL Final    ABS. MONOCYTES 06/16/2020 0.5  0.1 - 1.3 K/UL Final    ABS. EOSINOPHILS 06/16/2020 0.3  0.0 - 0.8 K/UL Final    ABS.  BASOPHILS 06/16/2020 0.1  0.0 - 0.2 K/UL Final    ABS. IMM. GRANS. 06/16/2020 0.0  0.0 - 0.5 K/UL Final    Prothrombin time 06/16/2020 14.2  12.0 - 14.7 sec Final    INR 06/16/2020 1.1    Final    Comment: Suggested therapeutic INR range:  Venous thrombosis and embolus  2.0-3.0  Prosthetic heart valve         2.5-3.5  ** Note new reference range and method **      aPTT 06/16/2020 27.8  24.3 - 35.4 SEC Final    Comment: Heparin Therapeutic Range = 74 - 123 seconds  In addition to factor deficiency, monitoring heparin therapy, etc., evaluation of a prolonged aPTT result should include consideration of preanalytic variables such as heparin flush contamination, specimen integrity issues, etc.      Sodium 06/16/2020 139  136 - 145 mmol/L Final    Potassium 06/16/2020 3.7  3.5 - 5.1 mmol/L Final    Chloride 06/16/2020 105  98 - 107 mmol/L Final    CO2 06/16/2020 28  21 - 32 mmol/L Final    Anion gap 06/16/2020 6* 7 - 16 mmol/L Final    Glucose 06/16/2020 99  65 - 100 mg/dL Final    Comment: 47 - 60 mg/dl Consistent with, but not fully diagnostic of hypoglycemia. 101 - 125 mg/dl Impaired fasting glucose/consistent with pre-diabetes mellitus  > 126 mg/dl Fasting glucose consistent with overt diabetes mellitus      BUN 06/16/2020 27* 8 - 23 MG/DL Final    Creatinine 06/16/2020 1.08* 0.6 - 1.0 MG/DL Final    GFR est AA 06/16/2020 >60  >60 ml/min/1.73m2 Final    GFR est non-AA 06/16/2020 54* >60 ml/min/1.73m2 Final    Comment: (NOTE)  Estimated GFR is calculated using the Modification of Diet in Renal   Disease (MDRD) Study equation, reported for both  Americans   (GFRAA) and non- Americans (GFRNA), and normalized to 1.73m2   body surface area. The physician must decide which value applies to   the patient. The MDRD study equation should only be used in   individuals age 25 or older.  It has not been validated for the   following: pregnant women, patients with serious comorbid conditions,   or on certain medications, or persons with extremes of body size,   muscle mass, or nutritional status.  Calcium 06/16/2020 8.9  8.3 - 10.4 MG/DL Final    Hemoglobin A1c 06/16/2020 5.6  % Final    Est. average glucose 06/16/2020 114  mg/dL Final    Comment: (NOTE)  The eAG should be interpreted with patient characteristics in mind   since ethnicity, interindividual differences, red cell lifespan,   variation in rates of glycation, etc. may affect the validity of the   calculation.  Special Requests: 06/16/2020 NASAL O2    Final    Culture result: 06/16/2020 SA target not detected. A MRSA NEGATIVE, SA NEGATIVE test result does not preclude MRSA or SA nasal colonization.     Final    Ventricular Rate 06/16/2020 61  BPM Final    Atrial Rate 06/16/2020 61  BPM Final    P-R Interval 06/16/2020 170  ms Final    QRS Duration 06/16/2020 86  ms Final    Q-T Interval 06/16/2020 422  ms Final    QTC Calculation (Bezet) 06/16/2020 424  ms Final    Calculated P Axis 06/16/2020 48  degrees Final    Calculated R Axis 06/16/2020 -3  degrees Final    Calculated T Axis 06/16/2020 23  degrees Final    Diagnosis 06/16/2020    Final                    Value:Normal sinus rhythm  Low voltage QRS  Possible Anterolateral infarct , age undetermined  Abnormal ECG  No previous ECGs available  Confirmed by RALPH YOUNG (), Kristian Davis ([id number]) on 6/16/2020 12:37:17 PM                   Patient Active Problem List   Diagnosis Code    Osteoarthritis of left knee M17.12    S/P total knee replacement using cement Z96.659    HTN (hypertension) I10    Anxiety F41.9    Hyperlipidemia E78.5    Hypertension I10    Bronchitis J40    Sicca syndrome (HCC) M35.00    Hypothyroidism E03.9    GERD (gastroesophageal reflux disease) K21.9    Back pain M54.9    Sciatica M54.30    Osteoarthritis M19.90    Knee joint replacement status Z96.659    Hair loss L65.9    Encounter for long-term (current) use of other medications Z79.899    Snoring R06.83    Fatigue R53.83    Sleep disorder G47.9    Obesity E66.9    Allergic rhinitis J30.9    Hypersomnolence G47.10    Asthma J45.909    Cholecystitis with cholelithiasis K80.10    Cervical radicular pain M54.12    Elevated liver function tests R79.89    Hypothyroidism, adult E03.9    Vitamin D deficiency E55.9    Obesity, morbid (HCC) E66.01    Mild intermittent asthma without complication L21.69    Suspected sleep apnea R29.818         Assessment:   1. Arthritis of the right knee      Plan:    1. Proceed with scheduled Procedure(s) (LRB):  RIGHT KNEE ARTHROPLASTY TOTAL ROBOTIC ASSISTED / HIEN/USAMA / ADDUCTOR CANAL BLOCK (Right)      The patient was counseled at length about the risks of farhat Covid-19 during their perioperative period and any recovery window from their procedure. The patient was made aware that farhat Covid-19  may worsen their prognosis for recovering from their procedure and lend to a higher morbidity and/or mortality risk. All material risks, benefits, and reasonable alternatives including postponing the procedure were discussed. The patient does  wish to proceed with the procedure at this time.          Signed By: KYAW Colmenares  July 7, 2020

## 2020-07-08 ENCOUNTER — ANESTHESIA (OUTPATIENT)
Dept: SURGERY | Age: 65
DRG: 470 | End: 2020-07-08
Payer: COMMERCIAL

## 2020-07-08 ENCOUNTER — HOSPITAL ENCOUNTER (INPATIENT)
Age: 65
LOS: 1 days | Discharge: HOME HEALTH | DRG: 470 | End: 2020-07-09
Attending: ORTHOPAEDIC SURGERY | Admitting: ORTHOPAEDIC SURGERY
Payer: COMMERCIAL

## 2020-07-08 DIAGNOSIS — Z96.651 STATUS POST RIGHT KNEE REPLACEMENT: Primary | ICD-10-CM

## 2020-07-08 LAB
GLUCOSE BLD STRIP.AUTO-MCNC: 108 MG/DL (ref 65–100)
HGB BLD-MCNC: 11.7 G/DL (ref 11.7–15.4)

## 2020-07-08 PROCEDURE — C1776 JOINT DEVICE (IMPLANTABLE): HCPCS | Performed by: ORTHOPAEDIC SURGERY

## 2020-07-08 PROCEDURE — 94762 N-INVAS EAR/PLS OXIMTRY CONT: CPT

## 2020-07-08 PROCEDURE — 77030018836 HC SOL IRR NACL ICUM -A: Performed by: ORTHOPAEDIC SURGERY

## 2020-07-08 PROCEDURE — 97161 PT EVAL LOW COMPLEX 20 MIN: CPT

## 2020-07-08 PROCEDURE — 97165 OT EVAL LOW COMPLEX 30 MIN: CPT

## 2020-07-08 PROCEDURE — 77030003602 HC NDL NRV BLK BBMI -B: Performed by: ANESTHESIOLOGY

## 2020-07-08 PROCEDURE — 65270000029 HC RM PRIVATE

## 2020-07-08 PROCEDURE — 77030008462 HC STPLR SKN PROX J&J -A: Performed by: ORTHOPAEDIC SURGERY

## 2020-07-08 PROCEDURE — 74011250636 HC RX REV CODE- 250/636: Performed by: ANESTHESIOLOGY

## 2020-07-08 PROCEDURE — 74011250636 HC RX REV CODE- 250/636: Performed by: PHYSICIAN ASSISTANT

## 2020-07-08 PROCEDURE — 76060000035 HC ANESTHESIA 2 TO 2.5 HR: Performed by: ORTHOPAEDIC SURGERY

## 2020-07-08 PROCEDURE — 74011000250 HC RX REV CODE- 250: Performed by: NURSE ANESTHETIST, CERTIFIED REGISTERED

## 2020-07-08 PROCEDURE — C1713 ANCHOR/SCREW BN/BN,TIS/BN: HCPCS | Performed by: ORTHOPAEDIC SURGERY

## 2020-07-08 PROCEDURE — 74011250636 HC RX REV CODE- 250/636: Performed by: ORTHOPAEDIC SURGERY

## 2020-07-08 PROCEDURE — 77030038149 HC BLD SAW SAG STRY -D: Performed by: ORTHOPAEDIC SURGERY

## 2020-07-08 PROCEDURE — 76010000171 HC OR TIME 2 TO 2.5 HR INTENSV-TIER 1: Performed by: ORTHOPAEDIC SURGERY

## 2020-07-08 PROCEDURE — 77030013708 HC HNDPC SUC IRR PULS STRY –B: Performed by: ORTHOPAEDIC SURGERY

## 2020-07-08 PROCEDURE — 85018 HEMOGLOBIN: CPT

## 2020-07-08 PROCEDURE — 77030040922 HC BLNKT HYPOTHRM STRY -A: Performed by: ANESTHESIOLOGY

## 2020-07-08 PROCEDURE — 76210000006 HC OR PH I REC 0.5 TO 1 HR: Performed by: ORTHOPAEDIC SURGERY

## 2020-07-08 PROCEDURE — 77030035236 HC SUT PDS STRATFX BARB J&J -B: Performed by: ORTHOPAEDIC SURGERY

## 2020-07-08 PROCEDURE — 74011250636 HC RX REV CODE- 250/636: Performed by: NURSE ANESTHETIST, CERTIFIED REGISTERED

## 2020-07-08 PROCEDURE — 77030040361 HC SLV COMPR DVT MDII -B

## 2020-07-08 PROCEDURE — 77030029828 HC FEM TIB CKPNT KT DISP STRY -B: Performed by: ORTHOPAEDIC SURGERY

## 2020-07-08 PROCEDURE — 74011250637 HC RX REV CODE- 250/637: Performed by: ANESTHESIOLOGY

## 2020-07-08 PROCEDURE — 0SRC0JZ REPLACEMENT OF RIGHT KNEE JOINT WITH SYNTHETIC SUBSTITUTE, OPEN APPROACH: ICD-10-PCS | Performed by: ORTHOPAEDIC SURGERY

## 2020-07-08 PROCEDURE — 36415 COLL VENOUS BLD VENIPUNCTURE: CPT

## 2020-07-08 PROCEDURE — 77030019557 HC ELECTRD VES SEAL MEDT -F: Performed by: ORTHOPAEDIC SURGERY

## 2020-07-08 PROCEDURE — 8E0Y4CZ ROBOTIC ASSISTED PROCEDURE OF LOWER EXTREMITY, PERCUTANEOUS ENDOSCOPIC APPROACH: ICD-10-PCS | Performed by: ORTHOPAEDIC SURGERY

## 2020-07-08 PROCEDURE — 77030031139 HC SUT VCRL2 J&J -A: Performed by: ORTHOPAEDIC SURGERY

## 2020-07-08 PROCEDURE — 76010010054 HC POST OP PAIN BLOCK: Performed by: ORTHOPAEDIC SURGERY

## 2020-07-08 PROCEDURE — 77030007880 HC KT SPN EPDRL BBMI -B: Performed by: ANESTHESIOLOGY

## 2020-07-08 PROCEDURE — 74011000250 HC RX REV CODE- 250: Performed by: PHYSICIAN ASSISTANT

## 2020-07-08 PROCEDURE — 77030003665 HC NDL SPN BBMI -A: Performed by: ANESTHESIOLOGY

## 2020-07-08 PROCEDURE — 77030002912 HC SUT ETHBND J&J -A: Performed by: ORTHOPAEDIC SURGERY

## 2020-07-08 PROCEDURE — 74011000250 HC RX REV CODE- 250: Performed by: ORTHOPAEDIC SURGERY

## 2020-07-08 PROCEDURE — 74011250637 HC RX REV CODE- 250/637: Performed by: PHYSICIAN ASSISTANT

## 2020-07-08 PROCEDURE — 77030036688 HC BLNKT CLD THER S2SG -B

## 2020-07-08 PROCEDURE — 94760 N-INVAS EAR/PLS OXIMETRY 1: CPT

## 2020-07-08 PROCEDURE — 74011000258 HC RX REV CODE- 258: Performed by: ORTHOPAEDIC SURGERY

## 2020-07-08 PROCEDURE — 77030029820: Performed by: ORTHOPAEDIC SURGERY

## 2020-07-08 PROCEDURE — 77030002966 HC SUT PDS J&J -A: Performed by: ORTHOPAEDIC SURGERY

## 2020-07-08 PROCEDURE — 82962 GLUCOSE BLOOD TEST: CPT

## 2020-07-08 PROCEDURE — 77030012935 HC DRSG AQUACEL BMS -B: Performed by: ORTHOPAEDIC SURGERY

## 2020-07-08 PROCEDURE — 76942 ECHO GUIDE FOR BIOPSY: CPT | Performed by: ORTHOPAEDIC SURGERY

## 2020-07-08 PROCEDURE — 74011000250 HC RX REV CODE- 250: Performed by: ANESTHESIOLOGY

## 2020-07-08 DEVICE — CRUCIATE RETAINING FEMORAL
Type: IMPLANTABLE DEVICE | Site: KNEE | Status: FUNCTIONAL
Brand: TRIATHLON

## 2020-07-08 DEVICE — KNEE K2 TOT HEMI ADV CMTLS -- IMPL CAPPED K2: Type: IMPLANTABLE DEVICE | Status: FUNCTIONAL

## 2020-07-08 DEVICE — TIBIAL COMPONENT
Type: IMPLANTABLE DEVICE | Site: KNEE | Status: FUNCTIONAL
Brand: TRIATHLON

## 2020-07-08 DEVICE — TIBIAL BEARING INSERT
Type: IMPLANTABLE DEVICE | Site: KNEE | Status: FUNCTIONAL
Brand: TRIATHLON

## 2020-07-08 RX ORDER — NALOXONE HYDROCHLORIDE 0.4 MG/ML
.2-.4 INJECTION, SOLUTION INTRAMUSCULAR; INTRAVENOUS; SUBCUTANEOUS
Status: DISCONTINUED | OUTPATIENT
Start: 2020-07-08 | End: 2020-07-09 | Stop reason: HOSPADM

## 2020-07-08 RX ORDER — LEVOTHYROXINE SODIUM 88 UG/1
88 TABLET ORAL
Status: DISCONTINUED | OUTPATIENT
Start: 2020-07-09 | End: 2020-07-09 | Stop reason: HOSPADM

## 2020-07-08 RX ORDER — LIDOCAINE HYDROCHLORIDE 20 MG/ML
INJECTION, SOLUTION EPIDURAL; INFILTRATION; INTRACAUDAL; PERINEURAL AS NEEDED
Status: DISCONTINUED | OUTPATIENT
Start: 2020-07-08 | End: 2020-07-08 | Stop reason: HOSPADM

## 2020-07-08 RX ORDER — POTASSIUM CHLORIDE 20 MEQ/1
20 TABLET, EXTENDED RELEASE ORAL DAILY
Status: DISCONTINUED | OUTPATIENT
Start: 2020-07-09 | End: 2020-07-09 | Stop reason: HOSPADM

## 2020-07-08 RX ORDER — ACETAMINOPHEN 500 MG
1000 TABLET ORAL EVERY 6 HOURS
Status: DISCONTINUED | OUTPATIENT
Start: 2020-07-08 | End: 2020-07-09 | Stop reason: HOSPADM

## 2020-07-08 RX ORDER — HYDROMORPHONE HYDROCHLORIDE 2 MG/1
2 TABLET ORAL
Status: DISCONTINUED | OUTPATIENT
Start: 2020-07-08 | End: 2020-07-09 | Stop reason: HOSPADM

## 2020-07-08 RX ORDER — HYDROMORPHONE HYDROCHLORIDE 2 MG/ML
0.5 INJECTION, SOLUTION INTRAMUSCULAR; INTRAVENOUS; SUBCUTANEOUS
Status: DISCONTINUED | OUTPATIENT
Start: 2020-07-08 | End: 2020-07-08 | Stop reason: HOSPADM

## 2020-07-08 RX ORDER — PROPOFOL 10 MG/ML
INJECTION, EMULSION INTRAVENOUS
Status: DISCONTINUED | OUTPATIENT
Start: 2020-07-08 | End: 2020-07-08 | Stop reason: HOSPADM

## 2020-07-08 RX ORDER — SODIUM CHLORIDE, SODIUM LACTATE, POTASSIUM CHLORIDE, CALCIUM CHLORIDE 600; 310; 30; 20 MG/100ML; MG/100ML; MG/100ML; MG/100ML
75 INJECTION, SOLUTION INTRAVENOUS CONTINUOUS
Status: DISCONTINUED | OUTPATIENT
Start: 2020-07-08 | End: 2020-07-08 | Stop reason: HOSPADM

## 2020-07-08 RX ORDER — BUPIVACAINE HYDROCHLORIDE 7.5 MG/ML
INJECTION, SOLUTION INTRASPINAL AS NEEDED
Status: DISCONTINUED | OUTPATIENT
Start: 2020-07-08 | End: 2020-07-08 | Stop reason: HOSPADM

## 2020-07-08 RX ORDER — ACETAMINOPHEN 650 MG/1
650 SUPPOSITORY RECTAL ONCE
Status: DISCONTINUED | OUTPATIENT
Start: 2020-07-08 | End: 2020-07-08 | Stop reason: ALTCHOICE

## 2020-07-08 RX ORDER — SODIUM CHLORIDE, SODIUM LACTATE, POTASSIUM CHLORIDE, CALCIUM CHLORIDE 600; 310; 30; 20 MG/100ML; MG/100ML; MG/100ML; MG/100ML
100 INJECTION, SOLUTION INTRAVENOUS CONTINUOUS
Status: DISCONTINUED | OUTPATIENT
Start: 2020-07-08 | End: 2020-07-08 | Stop reason: HOSPADM

## 2020-07-08 RX ORDER — PROPOFOL 10 MG/ML
INJECTION, EMULSION INTRAVENOUS AS NEEDED
Status: DISCONTINUED | OUTPATIENT
Start: 2020-07-08 | End: 2020-07-08 | Stop reason: HOSPADM

## 2020-07-08 RX ORDER — TRANEXAMIC ACID 100 MG/ML
INJECTION, SOLUTION INTRAVENOUS AS NEEDED
Status: DISCONTINUED | OUTPATIENT
Start: 2020-07-08 | End: 2020-07-08 | Stop reason: HOSPADM

## 2020-07-08 RX ORDER — AMOXICILLIN 250 MG
2 CAPSULE ORAL DAILY
Status: DISCONTINUED | OUTPATIENT
Start: 2020-07-09 | End: 2020-07-09 | Stop reason: HOSPADM

## 2020-07-08 RX ORDER — MIDAZOLAM HYDROCHLORIDE 1 MG/ML
INJECTION, SOLUTION INTRAMUSCULAR; INTRAVENOUS AS NEEDED
Status: DISCONTINUED | OUTPATIENT
Start: 2020-07-08 | End: 2020-07-08 | Stop reason: HOSPADM

## 2020-07-08 RX ORDER — EPHEDRINE SULFATE/0.9% NACL/PF 50 MG/5 ML
SYRINGE (ML) INTRAVENOUS AS NEEDED
Status: DISCONTINUED | OUTPATIENT
Start: 2020-07-08 | End: 2020-07-08 | Stop reason: HOSPADM

## 2020-07-08 RX ORDER — HYDROMORPHONE HYDROCHLORIDE 1 MG/ML
1 INJECTION, SOLUTION INTRAMUSCULAR; INTRAVENOUS; SUBCUTANEOUS
Status: DISCONTINUED | OUTPATIENT
Start: 2020-07-08 | End: 2020-07-08

## 2020-07-08 RX ORDER — SODIUM CHLORIDE 0.9 % (FLUSH) 0.9 %
5-40 SYRINGE (ML) INJECTION AS NEEDED
Status: DISCONTINUED | OUTPATIENT
Start: 2020-07-08 | End: 2020-07-08 | Stop reason: HOSPADM

## 2020-07-08 RX ORDER — HYDROCHLOROTHIAZIDE 25 MG/1
12.5 TABLET ORAL DAILY
Status: DISCONTINUED | OUTPATIENT
Start: 2020-07-09 | End: 2020-07-09 | Stop reason: HOSPADM

## 2020-07-08 RX ORDER — DEXAMETHASONE SODIUM PHOSPHATE 4 MG/ML
INJECTION, SOLUTION INTRA-ARTICULAR; INTRALESIONAL; INTRAMUSCULAR; INTRAVENOUS; SOFT TISSUE
Status: COMPLETED | OUTPATIENT
Start: 2020-07-08 | End: 2020-07-08

## 2020-07-08 RX ORDER — ONDANSETRON 2 MG/ML
INJECTION INTRAMUSCULAR; INTRAVENOUS AS NEEDED
Status: DISCONTINUED | OUTPATIENT
Start: 2020-07-08 | End: 2020-07-08 | Stop reason: HOSPADM

## 2020-07-08 RX ORDER — DIPHENHYDRAMINE HCL 25 MG
25 CAPSULE ORAL
Status: DISCONTINUED | OUTPATIENT
Start: 2020-07-08 | End: 2020-07-09 | Stop reason: HOSPADM

## 2020-07-08 RX ORDER — OXYCODONE AND ACETAMINOPHEN 5; 325 MG/1; MG/1
1 TABLET ORAL AS NEEDED
Status: DISCONTINUED | OUTPATIENT
Start: 2020-07-08 | End: 2020-07-08 | Stop reason: HOSPADM

## 2020-07-08 RX ORDER — DIPHENHYDRAMINE HYDROCHLORIDE 50 MG/ML
12.5 INJECTION, SOLUTION INTRAMUSCULAR; INTRAVENOUS
Status: DISCONTINUED | OUTPATIENT
Start: 2020-07-08 | End: 2020-07-08 | Stop reason: HOSPADM

## 2020-07-08 RX ORDER — SODIUM CHLORIDE 0.9 % (FLUSH) 0.9 %
5-40 SYRINGE (ML) INJECTION EVERY 8 HOURS
Status: DISCONTINUED | OUTPATIENT
Start: 2020-07-08 | End: 2020-07-08 | Stop reason: HOSPADM

## 2020-07-08 RX ORDER — DEXAMETHASONE SODIUM PHOSPHATE 100 MG/10ML
10 INJECTION INTRAMUSCULAR; INTRAVENOUS ONCE
Status: DISCONTINUED | OUTPATIENT
Start: 2020-07-09 | End: 2020-07-09 | Stop reason: HOSPADM

## 2020-07-08 RX ORDER — FENTANYL CITRATE 50 UG/ML
25 INJECTION, SOLUTION INTRAMUSCULAR; INTRAVENOUS ONCE
Status: COMPLETED | OUTPATIENT
Start: 2020-07-08 | End: 2020-07-08

## 2020-07-08 RX ORDER — CEFAZOLIN SODIUM/WATER 2 G/20 ML
2 SYRINGE (ML) INTRAVENOUS ONCE
Status: COMPLETED | OUTPATIENT
Start: 2020-07-08 | End: 2020-07-08

## 2020-07-08 RX ORDER — ASPIRIN 81 MG/1
81 TABLET ORAL EVERY 12 HOURS
Qty: 84 TAB | Refills: 0 | Status: SHIPPED | OUTPATIENT
Start: 2020-07-08 | End: 2020-08-19

## 2020-07-08 RX ORDER — MORPHINE SULFATE 10 MG/ML
8 INJECTION, SOLUTION INTRAMUSCULAR; INTRAVENOUS
Status: DISCONTINUED | OUTPATIENT
Start: 2020-07-08 | End: 2020-07-09 | Stop reason: HOSPADM

## 2020-07-08 RX ORDER — MIDAZOLAM HYDROCHLORIDE 1 MG/ML
2 INJECTION, SOLUTION INTRAMUSCULAR; INTRAVENOUS
Status: DISCONTINUED | OUTPATIENT
Start: 2020-07-08 | End: 2020-07-08 | Stop reason: HOSPADM

## 2020-07-08 RX ORDER — IPRATROPIUM BROMIDE AND ALBUTEROL SULFATE 2.5; .5 MG/3ML; MG/3ML
3 SOLUTION RESPIRATORY (INHALATION)
Status: DISCONTINUED | OUTPATIENT
Start: 2020-07-08 | End: 2020-07-09 | Stop reason: HOSPADM

## 2020-07-08 RX ORDER — MIDAZOLAM HYDROCHLORIDE 1 MG/ML
2 INJECTION, SOLUTION INTRAMUSCULAR; INTRAVENOUS ONCE
Status: COMPLETED | OUTPATIENT
Start: 2020-07-08 | End: 2020-07-08

## 2020-07-08 RX ORDER — SERTRALINE HYDROCHLORIDE 50 MG/1
50 TABLET, FILM COATED ORAL DAILY
Status: DISCONTINUED | OUTPATIENT
Start: 2020-07-09 | End: 2020-07-09 | Stop reason: HOSPADM

## 2020-07-08 RX ORDER — DEXAMETHASONE SODIUM PHOSPHATE 4 MG/ML
INJECTION, SOLUTION INTRA-ARTICULAR; INTRALESIONAL; INTRAMUSCULAR; INTRAVENOUS; SOFT TISSUE AS NEEDED
Status: DISCONTINUED | OUTPATIENT
Start: 2020-07-08 | End: 2020-07-08 | Stop reason: HOSPADM

## 2020-07-08 RX ORDER — VALSARTAN 320 MG/1
320 TABLET ORAL DAILY
Status: DISCONTINUED | OUTPATIENT
Start: 2020-07-09 | End: 2020-07-09 | Stop reason: HOSPADM

## 2020-07-08 RX ORDER — CEFAZOLIN SODIUM/WATER 2 G/20 ML
2 SYRINGE (ML) INTRAVENOUS EVERY 8 HOURS
Status: COMPLETED | OUTPATIENT
Start: 2020-07-08 | End: 2020-07-09

## 2020-07-08 RX ORDER — SODIUM CHLORIDE 9 MG/ML
100 INJECTION, SOLUTION INTRAVENOUS CONTINUOUS
Status: DISCONTINUED | OUTPATIENT
Start: 2020-07-08 | End: 2020-07-09 | Stop reason: HOSPADM

## 2020-07-08 RX ORDER — OXYCODONE HYDROCHLORIDE 5 MG/1
5 TABLET ORAL
Status: DISCONTINUED | OUTPATIENT
Start: 2020-07-08 | End: 2020-07-08 | Stop reason: HOSPADM

## 2020-07-08 RX ORDER — MELOXICAM 7.5 MG/1
15 TABLET ORAL DAILY
Qty: 60 TAB | Refills: 0 | Status: SHIPPED | OUTPATIENT
Start: 2020-07-08 | End: 2020-08-07

## 2020-07-08 RX ORDER — SODIUM CHLORIDE 0.9 % (FLUSH) 0.9 %
5-40 SYRINGE (ML) INJECTION EVERY 8 HOURS
Status: DISCONTINUED | OUTPATIENT
Start: 2020-07-08 | End: 2020-07-09 | Stop reason: HOSPADM

## 2020-07-08 RX ORDER — HYDROMORPHONE HYDROCHLORIDE 2 MG/1
2 TABLET ORAL
Qty: 40 TAB | Refills: 0 | Status: SHIPPED | OUTPATIENT
Start: 2020-07-08 | End: 2020-07-13

## 2020-07-08 RX ORDER — ASPIRIN 81 MG/1
81 TABLET ORAL EVERY 12 HOURS
Status: DISCONTINUED | OUTPATIENT
Start: 2020-07-08 | End: 2020-07-09 | Stop reason: HOSPADM

## 2020-07-08 RX ORDER — FAMOTIDINE 20 MG/1
40 TABLET, FILM COATED ORAL DAILY
Status: DISCONTINUED | OUTPATIENT
Start: 2020-07-09 | End: 2020-07-09 | Stop reason: HOSPADM

## 2020-07-08 RX ORDER — LIDOCAINE HYDROCHLORIDE 10 MG/ML
0.1 INJECTION INFILTRATION; PERINEURAL AS NEEDED
Status: DISCONTINUED | OUTPATIENT
Start: 2020-07-08 | End: 2020-07-08 | Stop reason: HOSPADM

## 2020-07-08 RX ORDER — ACETAMINOPHEN 325 MG/1
975 TABLET ORAL ONCE
Status: DISCONTINUED | OUTPATIENT
Start: 2020-07-08 | End: 2020-07-08 | Stop reason: ALTCHOICE

## 2020-07-08 RX ORDER — CARBOXYMETHYLCELLULOSE SODIUM 10 MG/ML
1 GEL OPHTHALMIC AS NEEDED
Status: DISCONTINUED | OUTPATIENT
Start: 2020-07-08 | End: 2020-07-09 | Stop reason: HOSPADM

## 2020-07-08 RX ORDER — ONDANSETRON 4 MG/1
4 TABLET, ORALLY DISINTEGRATING ORAL
Status: DISCONTINUED | OUTPATIENT
Start: 2020-07-08 | End: 2020-07-09 | Stop reason: HOSPADM

## 2020-07-08 RX ORDER — SODIUM CHLORIDE 0.9 % (FLUSH) 0.9 %
5-40 SYRINGE (ML) INJECTION AS NEEDED
Status: DISCONTINUED | OUTPATIENT
Start: 2020-07-08 | End: 2020-07-09 | Stop reason: HOSPADM

## 2020-07-08 RX ORDER — ROPIVACAINE HYDROCHLORIDE 2 MG/ML
INJECTION, SOLUTION EPIDURAL; INFILTRATION; PERINEURAL AS NEEDED
Status: DISCONTINUED | OUTPATIENT
Start: 2020-07-08 | End: 2020-07-08 | Stop reason: HOSPADM

## 2020-07-08 RX ORDER — FAMOTIDINE 20 MG/1
20 TABLET, FILM COATED ORAL ONCE
Status: COMPLETED | OUTPATIENT
Start: 2020-07-08 | End: 2020-07-08

## 2020-07-08 RX ORDER — FLUTICASONE PROPIONATE 50 MCG
2 SPRAY, SUSPENSION (ML) NASAL DAILY
Status: DISCONTINUED | OUTPATIENT
Start: 2020-07-09 | End: 2020-07-09 | Stop reason: HOSPADM

## 2020-07-08 RX ADMIN — SODIUM CHLORIDE, SODIUM LACTATE, POTASSIUM CHLORIDE, AND CALCIUM CHLORIDE: 600; 310; 30; 20 INJECTION, SOLUTION INTRAVENOUS at 09:41

## 2020-07-08 RX ADMIN — Medication 3 AMPULE: at 08:18

## 2020-07-08 RX ADMIN — MORPHINE SULFATE 8 MG: 10 INJECTION INTRAVENOUS at 15:31

## 2020-07-08 RX ADMIN — HYDROMORPHONE HYDROCHLORIDE 2 MG: 2 TABLET ORAL at 14:18

## 2020-07-08 RX ADMIN — PROPOFOL 25 MCG/KG/MIN: 10 INJECTION, EMULSION INTRAVENOUS at 10:05

## 2020-07-08 RX ADMIN — Medication 2 G: at 17:09

## 2020-07-08 RX ADMIN — HYDROMORPHONE HYDROCHLORIDE 2 MG: 2 TABLET ORAL at 23:42

## 2020-07-08 RX ADMIN — PHENYLEPHRINE HYDROCHLORIDE 100 MCG: 10 INJECTION INTRAVENOUS at 10:14

## 2020-07-08 RX ADMIN — DEXAMETHASONE SODIUM PHOSPHATE 10 MG: 4 INJECTION, SOLUTION INTRAMUSCULAR; INTRAVENOUS at 10:20

## 2020-07-08 RX ADMIN — ASPIRIN 81 MG: 81 TABLET, COATED ORAL at 20:54

## 2020-07-08 RX ADMIN — FENTANYL CITRATE 100 MCG: 50 INJECTION INTRAMUSCULAR; INTRAVENOUS at 09:01

## 2020-07-08 RX ADMIN — PHENYLEPHRINE HYDROCHLORIDE 100 MCG: 10 INJECTION INTRAVENOUS at 10:31

## 2020-07-08 RX ADMIN — BUPIVACAINE HYDROCHLORIDE IN DEXTROSE 1.8 ML: 7.5 INJECTION, SOLUTION SUBARACHNOID at 09:52

## 2020-07-08 RX ADMIN — Medication 5 MG: at 10:14

## 2020-07-08 RX ADMIN — PHENYLEPHRINE HYDROCHLORIDE 150 MCG: 10 INJECTION INTRAVENOUS at 10:44

## 2020-07-08 RX ADMIN — LIDOCAINE HYDROCHLORIDE 100 MG: 20 INJECTION, SOLUTION EPIDURAL; INFILTRATION; INTRACAUDAL; PERINEURAL at 10:00

## 2020-07-08 RX ADMIN — ACETAMINOPHEN 1000 MG: 500 TABLET, FILM COATED ORAL at 23:42

## 2020-07-08 RX ADMIN — OXYCODONE HYDROCHLORIDE AND ACETAMINOPHEN 1 TABLET: 5; 325 TABLET ORAL at 12:12

## 2020-07-08 RX ADMIN — Medication 2 G: at 09:42

## 2020-07-08 RX ADMIN — ROPIVACAINE HYDROCHLORIDE 20 ML: 2 INJECTION, SOLUTION EPIDURAL; INFILTRATION at 09:07

## 2020-07-08 RX ADMIN — ACETAMINOPHEN 1000 MG: 500 TABLET, FILM COATED ORAL at 17:09

## 2020-07-08 RX ADMIN — SODIUM CHLORIDE, SODIUM LACTATE, POTASSIUM CHLORIDE, AND CALCIUM CHLORIDE 75 ML/HR: 600; 310; 30; 20 INJECTION, SOLUTION INTRAVENOUS at 08:25

## 2020-07-08 RX ADMIN — PHENYLEPHRINE HYDROCHLORIDE 100 MCG: 10 INJECTION INTRAVENOUS at 10:07

## 2020-07-08 RX ADMIN — PHENYLEPHRINE HYDROCHLORIDE 150 MCG: 10 INJECTION INTRAVENOUS at 10:18

## 2020-07-08 RX ADMIN — Medication 10 MG: at 09:58

## 2020-07-08 RX ADMIN — HYDROMORPHONE HYDROCHLORIDE 2 MG: 2 TABLET ORAL at 19:45

## 2020-07-08 RX ADMIN — TRANEXAMIC ACID 1000 MG: 100 INJECTION, SOLUTION INTRAVENOUS at 10:08

## 2020-07-08 RX ADMIN — FAMOTIDINE 20 MG: 20 TABLET, FILM COATED ORAL at 08:18

## 2020-07-08 RX ADMIN — SODIUM CHLORIDE, SODIUM LACTATE, POTASSIUM CHLORIDE, AND CALCIUM CHLORIDE: 600; 310; 30; 20 INJECTION, SOLUTION INTRAVENOUS at 11:00

## 2020-07-08 RX ADMIN — ONDANSETRON 4 MG: 2 INJECTION INTRAMUSCULAR; INTRAVENOUS at 10:20

## 2020-07-08 RX ADMIN — MIDAZOLAM 2 MG: 1 INJECTION INTRAMUSCULAR; INTRAVENOUS at 09:47

## 2020-07-08 RX ADMIN — Medication 10 MG: at 10:07

## 2020-07-08 RX ADMIN — Medication 10 MG: at 10:18

## 2020-07-08 RX ADMIN — PHENYLEPHRINE HYDROCHLORIDE 100 MCG: 10 INJECTION INTRAVENOUS at 10:58

## 2020-07-08 RX ADMIN — Medication 1 AMPULE: at 20:54

## 2020-07-08 RX ADMIN — MIDAZOLAM 2 MG: 1 INJECTION INTRAMUSCULAR; INTRAVENOUS at 09:01

## 2020-07-08 RX ADMIN — PHENYLEPHRINE HYDROCHLORIDE 200 MCG: 10 INJECTION INTRAVENOUS at 09:57

## 2020-07-08 RX ADMIN — PROPOFOL 20 MG: 10 INJECTION, EMULSION INTRAVENOUS at 10:34

## 2020-07-08 RX ADMIN — Medication 10 MG: at 09:57

## 2020-07-08 RX ADMIN — DEXAMETHASONE SODIUM PHOSPHATE 5 MG: 4 INJECTION, SOLUTION INTRAMUSCULAR; INTRAVENOUS at 09:07

## 2020-07-08 NOTE — OP NOTES
[address] [place] [address] Robotic Assisted Total Knee Arthroplasty: Posterior Cruciate Retaining       [place]   : 1955  Medical Record [id number]  Pre-operative Diagnosis:  Unilateral primary osteoarthritis, right knee [M17.11]  Post-operative Diagnosis: Unilateral primary osteoarthritis, right knee [M17.11]  Location: [address] [place] [address]  Surgeon: Bret Gonzalez MD   Assistant: TAWANA Mensah, Regino Cheadle    Anesthesia: Spinal and FNB    Indications: Patient has end stage arthritis. They have tried and failed conservative management. Procedure:Procedure(s) (LRB):  RIGHT KNEE ARTHROPLASTY TOTAL ROBOTIC ASSISTED / HIEN/USAMA / ADDUCTOR CANAL BLOCK (Right)   CPT Code: 07679  The complexity of the total joint surgery requires the use of a first assistant for positioning, retraction and expertise in closure. Tourniquet Time: 0 minutes  EBL: 250 cc  Findings: severe degenerative arthritis, no patellar osteophytes with good patella cartilage, posterior femoral osteophytes   BMI: Body mass index is 44.4 kg/m². Didi Aponte was brought to the operating room and positioned on the operating table. She was anesthestized with anesthesia. A sandhu catheter was placed preoperatively and IV antibiotics was administered. Prior to the incision being made a timeout was called identifying the patient, procedure ,operative side and surgeon The operative leg was prepped and draped in the usual sterile manner. An anterior longitudinal incision was accomplished just medial to the tibial tubercle and extending approximal 6 centimeters proximal to the superior pole of the patella. A medial parapatellar capsular incision was performed. The medial capsular flap was elevated around to the insertion of the semimembranous tendon. The patella was everted and the knee flexed and externally rotated. The medial and external menisci were excised.   The lateral half of the fat pad excised and the patella femoral ligament was released. The anterior cruciate ligament was resect and the posterior cruciate ligament was retained. The femoral and tibial arrays were pinned in place and registered with the [address]. The patient landmarks were collected and the tibial and femoral checkpoints were registered and verified. The preresection balancing was performed. The distal femur was addressed first.   Utilizing the [facility] robotic arm the distal femoral cut was made. The anterior and posterior cuts were then made. The osteophytes were removed from the tibial and femoral surfaces. The tibia was then addressed. The [employer] robotic arm was then used to make the measured resection of the tibia. The tibia was sized. The tibial base plate was pinned into place with the appropriate external rotation and stem site prepared. A trial femoral component and poly was placed. A preliminary range of motion was accomplished with the trial components. Tthe patient was found to obtain full extension as well as appropriate flexion. The patient's ligaments were stable in flexion and extension to medial and lateral stressing and the alignment was through the appropriate mechanical axis. Additional surgical procedures included: none. A trial reduction of the patella revealed appropriate tracking through the patellofemoral groove with no lateral retinacular release being accomplished. All trial components were removed. The real implants were opened: Sizes listed below. The knee was irrigated. There were no femoral deficiencies. There were no tibial deficiencies. No augmentation was utilized. The real components were impacted into place. Dolphus Patience knee was placed through range of motion and noted to be stable as mentioned above with the trail components. The wound was dry, therefore no drain was used.  The operative knee was injected with 60 cc of Naropin, 10 cc's of morphine and 1 cc of 30 mg of Toradol. The knee was then soaked with a diluted betadine solution for approximately 3 min. This was then thoroughly irrigated. The capsular layer was closed using a #1 PDS suture. Then, 1 gram (100 mg/ml) of Transexamic Acid was injected into the joint space. The subcutaneous layers were closed using 2-0 Stratafix. Finally the skin was closed using 3-0 Vicryl and staples which were applied in occlusive fashion and sterile bandage applied. An Iceman cryo pad was applied on the operative leg. Sponge count and needle counts were correct. Beula Rang left the operating room     Implants:   Implant Name Type Inv.  Item Serial No.  Lot No. LRB No. Used   COMPNT FEM CR TRIATHLN 2 R PA --  - [id number]  COMPNT FEM CR TRIATHLN 2 R PA --  [id number] [employer] HOW [id number] Right 1   BASEPLT TIB PC TRITNM SZ 3 -- TRIATHLON - [id number]  BASEPLT TIB PC TRITNM SZ 3 -- TRIATHLON [id number] [facility] [employer]TH[employer]EDICS HOW [id number] Right 1   INSERT TIB ARTC BRNG SZ3 9MM -- TRIATHLON - [id number]  INSERT TIB ARTC BRNG SZ3 9MM -- TRIATHLON [id number] [facility] ORTHOPEDICS HOW [id number] Right 1         Signed By: Katie Bragg MD   7/8/2020,  11:02 AM

## 2020-07-08 NOTE — ANESTHESIA POSTPROCEDURE EVALUATION
Procedure(s):  RIGHT KNEE ARTHROPLASTY TOTAL ROBOTIC ASSISTED / HIEN/USAMA / ADDUCTOR CANAL BLOCK.    spinal    Anesthesia Post Evaluation      Multimodal analgesia: multimodal analgesia used between 6 hours prior to anesthesia start to PACU discharge  Patient location during evaluation: bedside  Patient participation: complete - patient participated  Level of consciousness: awake  Pain management: adequate  Airway patency: patent  Anesthetic complications: no  Cardiovascular status: acceptable and stable  Respiratory status: acceptable and nasal cannula  Hydration status: acceptable  Post anesthesia nausea and vomiting:  none      INITIAL Post-op Vital signs:   Vitals Value Taken Time   /54 7/8/2020 11:55 AM   Temp 36.9 °C (98.4 °F) 7/8/2020 11:51 AM   Pulse 66 7/8/2020 11:55 AM   Resp 18 7/8/2020 11:55 AM   SpO2 100 % 7/8/2020 11:55 AM

## 2020-07-08 NOTE — PROGRESS NOTES
TRANSFER - IN REPORT:    Verbal report received from CHEMA Krishnamurthy rn(name) on Kushal Gonzalez  being received from [facility]) for routine progression of care      Report consisted of patients Situation, Background, Assessment and   Recommendations(SBAR). Information from the following report(s) SBAR, Kardex, Procedure Summary, Intake/Output, MAR and Recent Results was reviewed with the receiving nurse. Opportunity for questions and clarification was provided. Assessment completed upon patients arrival to unit and care assumed.

## 2020-07-08 NOTE — PROGRESS NOTES
Problem: Self Care Deficits Care Plan (Adult)  Goal: *Acute Goals and Plan of Care (Insert Text)  Description: GOALS:   DISCHARGE GOALS (in preparation for going home/rehab):  3 days  1. Ms. Karey Somers will perform one lower body dressing activity with minimal assistance required to demonstrate improved functional mobility and safety. 2.  Ms. Karey Somers will perform one lower body bathing activity with minimal assistance required to demonstrate improved functional mobility and safety. 3.  Ms. Karey Somers will perform toileting/toilet transfer with contact guard assistance to demonstrate improved functional mobility and safety. 4.  Ms. Karey Somers will perform shower transfer with contact guard assistance to demonstrate improved functional mobility and safety. Outcome: Progressing Towards Goal     JOINT CAMP OCCUPATIONAL THERAPY TKA: Initial Assessment and PM 7/8/2020  INPATIENT: Hospital Day: 1  Payor: Margarita Tsang / Plan: On license of [facility] / Product Type: PPO /      NAME/AGE/GENDER: Tariq Burris is a 60 y.o. female   PRIMARY DIAGNOSIS:  Unilateral primary osteoarthritis, right knee [M17.11]   Procedure(s) and Anesthesia Type:     * RIGHT KNEE ARTHROPLASTY TOTAL ROBOTIC ASSISTED / HIEN/USAMA / ADDUCTOR CANAL BLOCK - Spinal (Right)  ICD-10: Treatment Diagnosis:    Pain in Right Knee (M25.561)  Stiffness of Right Knee, Not elsewhere classified (M25.661)  Other lack of cordination (R27.8)  Difficulty in walking, Not elsewhere classified (R26.2)  Other abnormalities of gait and mobility (R26.89)      ASSESSMENT:     Ms. Karey Somers is s/p right TKA and presents with decreased weight bearing on right LE and decreased independence with functional mobility and activities of daily living as compared to baseline level of function and safety. Patient would benefit from skilled Occupational Therapy to maximize independence and safety with self-care task and functional mobility.   Pt would also benefit from education on adaptive equipment and safety precautions in preparation for going home. She was having 10/10 pain but agreed to get up to the recliner to change position to help with the pain. She transferred with min assist supine to sit and min assist to take steps to recliner. She is reclined with ice pack given  and nursing is working on her medicine request. She wants to go home tomorrow. This section established at most recent assessment   PROBLEM LIST (Impairments causing functional limitations):  Decreased Strength  Decreased ADL/Functional Activities  Decreased Transfer Abilities  Increased Pain  Increased Fatigue  Decreased Flexibility/Joint Mobility  Decreased Knowledge of Precautions   INTERVENTIONS PLANNED: (Benefits and precautions of occupational therapy have been discussed with the patient.)  Activities of daily living training  Adaptive equipment training  Balance training  Clothing management  Donning&doffing training  Theraputic activity     TREATMENT PLAN: Frequency/Duration: Follow patient 2 times to address above goals. Rehabilitation Potential For Stated Goals: Good     RECOMMENDED REHABILITATION/EQUIPMENT: (at time of discharge pending progress): Continue Skilled Therapy. OCCUPATIONAL PROFILE AND HISTORY:   History of Present Injury/Illness (Reason for Referral): Pt presents this date s/p (right) TKA. Past Medical History/Comorbidities:   Ms. Zahraa Sorensen  has a past medical history of Arthritis, Asthma, Autoimmune disease ([facility] [address] (2017), Back pain (12/3/2012), Claustrophobia, Depression with anxiety, Elevated liver function tests (04/29/2015), GERD (gastroesophageal reflux disease), Hyperlipidemia (07/06/2015), Hypertension, Hypothyroidism, Menopause, Morbid obesity ([address] (07/06/2015), Oral ulcer (12/3/2012), Osteoarthritis (12/3/2012), Sciatica (12/3/2012), and Vitamin D deficiency (12/30/2015). She also has no past medical history of Unspecified adverse effect of anesthesia. Ms. Manjula Lake  has a past surgical history that includes hx orthopaedic (Right, 2012); hx breast biopsy (Right, ); hx  section (, ); hx dilation and curettage (2019); colonoscopy (N/A, 2019); hx lymph node dissection (); hx rotator cuff repair (Left, ); hx knee replacement (Left, 2012); and ir inj epidural cerv/thor steroid wo img. Social History/Living Environment:   Home Environment: Private residence  # Steps to Enter: 2  One/Two Story Residence: One story  Living Alone: No  Support Systems: Family member(s)  Patient Expects to be Discharged to[de-identified] Unknown  Current DME Used/Available at Home: None  Tub or Shower Type: Shower  Prior Level of Function/Work/Activity:  Mod i     Number of Personal Factors/Comorbidities that affect the Plan of Care: Brief history (0):  LOW COMPLEXITY   ASSESSMENT OF OCCUPATIONAL PERFORMANCE[de-identified]   Most Recent Physical Functioning:   Balance  Sitting: Intact  Standing: Pull to stand; With support                    Coordination  Fine Motor Skills-Upper: Left Intact; Right Intact  Gross Motor Skills-Upper: Left Intact; Right Intact         Mental Status  Neurologic State: Alert; Appropriate for age  Orientation Level: Appropriate for age  Cognition: Appropriate decision making; Appropriate for age attention/concentration; Appropriate safety awareness; Follows commands  Perception: Appears intact  Perseveration: No perseveration noted  Safety/Judgement: Awareness of environment; Fall prevention                Basic ADLs (From Assessment) Complex ADLs (From Assessment)   Basic ADL  Feeding: Supervision  Oral Facial Hygiene/Grooming: Supervision  Bathing: Moderate assistance  Upper Body Dressing: Supervision  Lower Body Dressing: Maximum assistance  Toileting: Moderate assistance     Grooming/Bathing/Dressing Activities of Daily Living     Cognitive Retraining  Safety/Judgement: Awareness of environment; Fall prevention                 Functional Transfers  Toilet Transfer : Minimum assistance  Shower Transfer: Minimum assistance     Bed/Mat Mobility  Supine to Sit: Minimum assistance  Sit to Stand: Minimum assistance  Stand to Sit: Minimum assistance  Bed to Chair: Minimum assistance  Scooting: Minimum assistance         Physical Skills Involved:  Strength  Activity Tolerance  Pain (acute) Cognitive Skills Affected (resulting in the inability to perform in a timely and safe manner):  none  Psychosocial Skills Affected:  none    Number of elements that affect the Plan of Care: 1-3:  LOW COMPLEXITY   CLINICAL DECISION MAKIN[address] [place] [address] AM-PAC 6 Clicks   Daily Activity Inpatient Short Form  How much help from another person does the patient currently need. .. Total A Lot A Little None   1. Putting on and taking off regular lower body clothing? [] 1   [x] 2   [] 3   [] 4   2. Bathing (including washing, rinsing, drying)? [] 1   [x] 2   [] 3   [] 4   3. Toileting, which includes using toilet, bedpan or urinal?   [] 1   [x] 2   [] 3   [] 4   4. Putting on and taking off regular upper body clothing? [] 1   [] 2   [] 3   [x] 4   5. Taking care of personal grooming such as brushing teeth? [] 1   [] 2   [] 3   [x] 4   6. Eating meals? [] 1   [] 2   [] 3   [x] 4   © , Trustees of [address] [place] [address], under license to [employer]. All rights reserved     Score:  Initial: 18 Most Recent: X (Date: -- )    Interpretation of Tool:  Represents activities that are increasingly more difficult (i.e. Bed mobility, Transfers, Gait).    Medical Necessity:     · Patient is expected to demonstrate progress in   · Self care skills and functional mobility  ·     Reason for Services/Other Comments:  · Patient continues to require skilled intervention due to   · Above listed deficits     Use of outcome tool(s) and clinical judgement create a POC that gives a: LOW COMPLEXITY            TREATMENT:   (In addition to Assessment/Re-Assessment sessions the following treatments were rendered)     Pre-treatment Symptoms/Complaints:    Pain: Initial:   Pain Intensity 1: 10  Pain Location 1: Back, Knee  Pain Orientation 1: Lower, Right  Pain Intervention(s) 1: Repositioned  Post Session:  10/10 icepack given, feels better oob,nursing aware of patient status. Assessment/Reassessment only, no treatment provided today    Treatment/Session Assessment:     Response to Treatment:  tolerated well, but having a lot of pain, very motivated to go home tomorrow. Education:  [] Home Exercises  [x] Fall Precautions  [] Hip Precautions [] Going Home Video  [x] Knee/Hip Prosthesis Review  [x] Walker Management/Safety [x] Adaptive Equipment as Needed       Interdisciplinary Collaboration:   Physical Therapist  Occupational Therapist  Registered Nurse    After treatment position/precautions:   Up in chair  Bed/Chair-wheels locked  Caregiver at bedside  Call light within reach  RN notified  Family at bedside     Compliance with Program/Exercises: Compliant all of the time. Recommendations/Intent for next treatment session:  Treatment next visit will focus on increasing Ms. Mcdonald's independence with bed mobility, transfers, self care, functional mobility, modalities for pain, and patient education.       Total Treatment Duration:  OT Patient Time In/Time Out  Time In: 1500  Time Out: [id number] [address], OT

## 2020-07-08 NOTE — ANESTHESIA PROCEDURE NOTES
Spinal Block    Start time: 7/8/2020 9:48 AM  End time: 7/8/2020 9:52 AM  Performed by: Kinza Yuan MD  Authorized by: Kinza Yuan MD     Pre-procedure:   Indications: at surgeon's request and primary anesthetic  Preanesthetic Checklist: patient identified, risks and benefits discussed, anesthesia consent, site marked, patient being monitored and timeout performed    Timeout Time: 09:48          Spinal Block:   Patient Position:  Seated  Prep Region:  Lumbar  Prep: chlorhexidine      Location:  L3-4  Technique:  Single shot        Needle:   Needle Type:  Pencan    Attempts:  1      Events: CSF confirmed, no blood with aspiration and no paresthesia        Assessment:  Insertion:  Uncomplicated  Patient tolerance:  Patient tolerated the procedure well with no immediate complications

## 2020-07-08 NOTE — ANESTHESIA PREPROCEDURE EVALUATION
Relevant Problems   No relevant active problems       Anesthetic History   No history of anesthetic complications            Review of Systems / Medical History  Patient summary reviewed and pertinent labs reviewed    Pulmonary                   Neuro/Psych         Psychiatric history     Cardiovascular    Hypertension: well controlled          Hyperlipidemia    Exercise tolerance: >4 METS  Comments: EF 60%   GI/Hepatic/Renal     GERD        Pertinent negatives: Liver disease: elevated LFTs. Endo/Other      Hypothyroidism: well controlled  Morbid obesity and arthritis     Other Findings   Comments: depression         Physical Exam    Airway  Mallampati: II  TM Distance: 4 - 6 cm  Neck ROM: normal range of motion   Mouth opening: Normal     Cardiovascular  Regular rate and rhythm,  S1 and S2 normal,  no murmur, click, rub, or gallop  Rhythm: regular  Rate: normal         Dental  No notable dental hx       Pulmonary  Breath sounds clear to auscultation               Abdominal  Abdominal exam normal      Comments: Skin very sensititive to touch.  Other Findings            Anesthetic Plan    ASA: 3  Anesthesia type: spinal      Post-op pain plan if not by surgeon: peripheral nerve block single    Induction: Intravenous  Anesthetic plan and risks discussed with: Patient

## 2020-07-08 NOTE — ANESTHESIA PROCEDURE NOTES
Peripheral Block    Start time: 7/8/2020 9:01 AM  End time: 7/8/2020 9:04 AM  Performed by: Roxana Garcia MD  Authorized by: Roxana Garcia MD       Pre-procedure: Indications: at surgeon's request and post-op pain management    Preanesthetic Checklist: patient identified, risks and benefits discussed, site marked, timeout performed, anesthesia consent given and patient being monitored    Timeout Time: 09:01          Block Type:   Block Type:   Adductor canal  Laterality:  Right  Monitoring:  Standard ASA monitoring, continuous pulse ox, frequent vital sign checks, heart rate, oxygen and responsive to questions  Injection Technique:  Single shot  Procedures: ultrasound guided and nerve stimulator    Patient Position: supine  Prep: chlorhexidine    Location:  Mid thigh  Needle Type:  Stimuplex  Needle Gauge:  22 G  Needle Localization:  Nerve stimulator and ultrasound guidance  Motor Response: minimal motor response >0.4 mA      Assessment:  Number of attempts:  1  Injection Assessment:  Local visualized surrounding nerve on ultrasound, negative aspiration for blood, no paresthesia, no intravascular symptoms, ultrasound image on chart and incremental injection every 5 mL  Patient tolerance:  Patient tolerated the procedure well with no immediate complications

## 2020-07-08 NOTE — PROGRESS NOTES
Pt resting well up in recliner. Tolerating dinner well. Pain to knee controlled at present. Pt has strong opush/ pulls to both feet and wiggles all toes well. All sensation has returned to both lower extremities. inst pt to call for needs.

## 2020-07-08 NOTE — PERIOP NOTES
Teach back method used in review of Hibiclens usage preop/postop, TB screening, pain management goals, falls precautions and use of Nozin for prevention of staph infections. Incentive spirometer- will review postop.

## 2020-07-08 NOTE — PROGRESS NOTES
07/08/20 1356   Oxygen Therapy   O2 Sat (%) 94 %   Pulse via Oximetry 63 beats per minute   O2 Device Room air   O2 Flow Rate (L/min) 0 l/min   Incentive Spirometry Treatment   Actual Volume (ml) 1500 ml   Patient encouraged to do 10 breaths every hour while awake-patient agreed and demonstrated. No shortness of breath or distress noted. BS are clear b/l. [facility] Camp notes reviewed- Sat monitor ordered HS. Home inhaler avalable at bedside.

## 2020-07-08 NOTE — H&P
The patient has end stage arthritis of the right knee. The patient was seen and examined and there are no changes to the patient's orthopedic condition. The necessity for the joint replacement is still present, and the H&P from the office is still current. The patient will be admitted today for Procedure(s) (LRB):  RIGHT KNEE ARTHROPLASTY TOTAL ROBOTIC ASSISTED / HIEN/USAMA / ADDUCTOR CANAL BLOCK (Right) .

## 2020-07-08 NOTE — PERIOP NOTES
Efrain called and reported he pulled a hang nail on his left thumb. He is now observing a pustule on the tip of his thumb. He verbalized that he does not want to go to the ED. His bld sugars have consistently been in the 200s. Patient was advised to go to urgent care for his infection. He told me that it had green exudate.  Please advise on Anti-diabetic medication adjustment to address the BS > 200 consistently.       exenatide (BYETTA) 5 MCG/0.02ML pen-injector 5 mcg BID      insulin glargine (LANTUS SOLOSTAR, BASAGLAR) 100 UNIT/ML pen-injector 46 u nightly      insulin lispro (HUMALOG) 100 UNIT/ML sliding scale injection Sliding scale 2-8 u         TRANSFER - OUT REPORT:    Verbal report given to NIEVES Bailey on Didi Aponte  being transferred to [facility] for routine post - op       Report consisted of patients Situation, Background, Assessment and   Recommendations(SBAR). Information from the following report(s) SBAR, OR Summary, Intake/Output and MAR was reviewed with the receiving nurse. Lines:   Peripheral IV 07/08/20 Left Arm (Active)   Site Assessment Clean, dry, & intact 7/8/2020 12:20 PM   Phlebitis Assessment 0 7/8/2020 12:20 PM   Infiltration Assessment 0 7/8/2020 12:20 PM   Dressing Status Clean, dry, & intact; Occlusive 7/8/2020 12:20 PM   Dressing Type Transparent;Tape 7/8/2020 12:20 PM   Hub Color/Line Status Pink; Infusing 7/8/2020  8:15 AM        Opportunity for questions and clarification was provided. Patient transported with:   Tech    VTE prophylaxis orders have been written for Didi Aponte. Patient and family given floor number and nurses name. Family updated re: pt status after security code verified.

## 2020-07-08 NOTE — CONSULTS
Chief Complaint: Right knee pain     We are asked to see this patient regarding care of medical issues including suspected sleep apnea. Patient Active Problem List    Diagnosis Date Noted    Status post right knee replacement 07/08/2020    Suspected sleep apnea 06/17/2020    Mild intermittent asthma without complication 19/41/6035    Obesity, morbid ([facility] [address] 10/02/2019    Vitamin D deficiency 12/30/2015    Hypothyroidism, adult 07/29/2015    Elevated liver function tests 04/29/2015    Cervical radicular pain 08/18/2014    Cholecystitis with cholelithiasis 07/14/2014    Asthma 01/14/2014    Snoring 09/12/2013    Fatigue 09/12/2013    Sleep disorder 09/12/2013    Obesity 09/12/2013    Allergic rhinitis 09/12/2013    Hypersomnolence 09/12/2013    Hyperlipidemia 12/03/2012    Hypertension 12/03/2012    Bronchitis 12/03/2012    Sicca syndrome ([facility] 12/03/2012    Hypothyroidism 12/03/2012    GERD (gastroesophageal reflux disease) 12/03/2012    Back pain 12/03/2012    Sciatica 12/03/2012    Osteoarthritis 12/03/2012    Knee joint replacement status 12/03/2012    Hair loss 12/03/2012    Encounter for long-term (current) use of other medications 12/03/2012    Osteoarthritis of left knee 10/17/2012    S/P total knee replacement using cement 10/17/2012    HTN (hypertension) 10/17/2012    Anxiety 10/17/2012       History of Present Illness:    Patient is a pleasant, alert, oriented  60 y.o.  female who underwent a right TKA today per Dr Renato Polanco under [facility] for end stage OA that has not responded to multiple non surgical treatments. She has done well both intraoperatively and in the immediate post op period with stable vitals, adequate pain control and no nausea. She is currently up in the chair with  at bedside. She is discovered to most likely have PASTORA during pre op screening.   Pre op recommendations include using oxygen at 3 liters at  HS, oxygen saturation monitoring, PEP therapy qid, albuterol q 6 hours prn. Plans to return home with [place] on discharge. Additional history as noted below. PAST MEDICAL HISTORY:  Past Medical History:   Diagnosis Date    Arthritis     osteo    Asthma     inhaler PRN- usually needs q 2 weeks; Dr Nickolas Nice (pulm follows)     Autoimmune disease ([facility] [address] 2017    had consult with Dr Brittany Lucas (rheum) 2017- no diagnosis- symptoms include fatigue, \"joints coming apart,\" dry eyes/mouth, tremors, positive EDILSON screen    Back pain 12/3/2012    Claustrophobia     Depression with anxiety     under control with med    Elevated liver function tests 04/29/2015    WNL 9/2019    GERD (gastroesophageal reflux disease)     managed with medication    Hyperlipidemia 07/06/2015    no meds- resolved presently    Hypertension     managed with medication    Hypothyroidism     managed with medication    Menopause     Morbid obesity ([facility] [address] 07/06/2015    BMI- 44    Oral ulcer 12/3/2012    Osteoarthritis 12/3/2012    Sciatica 12/3/2012    Vitamin D deficiency 12/30/2015          PAST SURGICAL HISTORY:  Past Surgical History:   Procedure Laterality Date    COLONOSCOPY N/A 11/5/2019    COLONOSCOPY performed by Zoe Staples MD at [facility] [address] [facility]    x 2    HX DILATION AND CURETTAGE  08/23/2019    HX KNEE REPLACEMENT Left 07/2012    HX LYMPH NODE DISSECTION  1990    neck- benign    HX ORTHOPAEDIC Right 07/2012    foot- \"toe fusion\"    HX ROTATOR CUFF REPAIR Left 2014    IR INJ EPIDURAL CERV/THOR STEROID      steroid injection         PTA MEDICATIONS:  Prior to Admission medications    Medication Sig Start Date End Date Taking? Authorizing Provider   aspirin delayed-release 81 mg tablet Take 1 Tab by mouth every twelve (12) hours for 42 days. 7/8/20 8/19/20 Yes KYAW Hull   HYDROmorphone (DILAUDID) 2 mg tablet Take 1 Tab by mouth every four (4) hours as needed for Pain for up to 5 days. Max Daily Amount: 12 mg. 7/8/20 7/13/20 Yes Gustabo Ely PA   meloxicam (MOBIC) 7.5 mg tablet Take 2 Tabs by mouth daily for 30 days. 7/8/20 8/7/20 Yes Gustabo Ely PA   polysorbate 80/glycerin (REFRESH DRY EYE THERAPY OP) Apply  to eye as needed. Yes Provider, Historical   albuterol (ProAir HFA) 90 mcg/actuation inhaler Take 2 Puffs by inhalation every four (4) hours as needed for Wheezing. 5/1/20  Yes Natty Koehler MD   diclofenac EC (VOLTAREN) 75 mg EC tablet Take 75 mg by mouth two (2) times a day. Yes Provider, Historical   fluocinolone acetonide oiL 0.01 % drop 3 Drops by Otic route daily. 4/21/20  Yes Serafin Ramos MD   mupirocin (Bactroban) 2 % ointment Apply  to affected area two (2) times a day. Patient taking differently: Apply  to affected area as needed. 4/21/20  Yes Serafin Ramos MD   phentermine (Adipex-P) 37.5 mg tablet Take 37.5 mg by mouth every morning. Yes Provider, Historical   sertraline (ZOLOFT) 50 mg tablet TAKE 1 TABLET BY MOUTH  DAILY 2/24/20  Yes Serafin Ramos MD   potassium chloride (K-DUR, KLOR-CON) 20 mEq tablet TAKE 1 TABLET BY MOUTH  EVERY MORNING 2/24/20  Yes Serafin Rmaos MD   levothyroxine (SYNTHROID) 88 mcg tablet TAKE 1 TABLET BY MOUTH  DAILY 2/24/20  Yes Serafin Ramos MD   telmisartan (MICARDIS) 80 mg tablet Take 1 Tab by mouth daily. 12/12/19  Yes Serafin Ramos MD   hydroCHLOROthiazide (HYDRODIURIL) 12.5 mg tablet Take 1 Tab by mouth daily. 12/12/19  Yes Serafin Ramos MD   famotidine (PEPCID) 20 mg tablet Take 1 Tab by mouth daily. Patient taking differently: Take 40 mg by mouth daily. 10/2/19  Yes Serafin Ramos MD   garlic 5,135 mg cap Take  by mouth daily. Yes Provider, Historical   fluticasone (FLONASE) 50 mcg/actuation nasal spray 2 Sprays by Both Nostrils route daily. Yes Provider, Historical   cholecalciferol, VITAMIN D3, (VITAMIN D3) 5,000 unit tab tablet Take 1 Tab by mouth daily.  7/11/16  Yes Serafin Ramos MD CURRENT INPATIENT MEDICATIONS:  Current Facility-Administered Medications   Medication Dose Route Frequency    albuterol-ipratropium (DUO-NEB) 2.5 MG-0.5 MG/3 ML  3 mL Nebulization Q4H PRN    [START ON 7/9/2020] famotidine (PEPCID) tablet 40 mg  40 mg Oral DAILY    [START ON 7/9/2020] fluticasone propionate (FLONASE) 50 mcg/actuation nasal spray 2 Spray  2 Spray Both Nostrils DAILY    [START ON 7/9/2020] hydroCHLOROthiazide (HYDRODIURIL) tablet 12.5 mg  12.5 mg Oral DAILY    [START ON 7/9/2020] levothyroxine (SYNTHROID) tablet 100 mcg  100 mcg Oral DAILY    . PHARMACY TO SUBSTITUTE PER PROTOCOL (Reordered from: polysorbate 80/glycerin (REFRESH DRY EYE THERAPY OP))    Per Protocol    [START ON 7/9/2020] potassium chloride (K-DUR, KLOR-CON) SR tablet 20 mEq  20 mEq Oral DAILY    [START ON 7/9/2020] sertraline (ZOLOFT) tablet 50 mg  50 mg Oral DAILY    . PHARMACY TO SUBSTITUTE PER PROTOCOL (Reordered from: telmisartan (MICARDIS) 80 mg tablet)    Per Protocol    alcohol 62% (NOZIN) nasal  1 Ampule  1 Ampule Topical Q12H    0.9% sodium chloride infusion  100 mL/hr IntraVENous CONTINUOUS    sodium chloride (NS) flush 5-40 mL  5-40 mL IntraVENous Q8H    sodium chloride (NS) flush 5-40 mL  5-40 mL IntraVENous PRN    ceFAZolin (ANCEF) 2 g/20 mL in sterile water IV syringe  2 g IntraVENous Q8H    acetaminophen (TYLENOL) tablet 1,000 mg  1,000 mg Oral Q6H    HYDROmorphone (DILAUDID) tablet 2 mg  2 mg Oral Q4H PRN    HYDROmorphone (PF) (DILAUDID) injection 1 mg  1 mg IntraVENous Q3H PRN    naloxone (NARCAN) injection 0.2-0.4 mg  0.2-0.4 mg IntraVENous Q10MIN PRN    [START ON 7/9/2020] dexamethasone (DECADRON) 10 mg/mL injection 10 mg  10 mg IntraVENous ONCE    ondansetron (ZOFRAN ODT) tablet 4 mg  4 mg Oral Q4H PRN    diphenhydrAMINE (BENADRYL) capsule 25 mg  25 mg Oral Q4H PRN    [START ON 7/9/2020] senna-docusate (PERICOLACE) 8.6-50 mg per tablet 2 Tab  2 Tab Oral DAILY    aspirin delayed-release tablet 81 mg  81 mg Oral Q12H         ALLERGIES:  Allergies   Allergen Reactions    Latex Rash     3 episodes of mouth sores, after dental visit (latex gloves)    Singulair [Montelukast] Shortness of Breath     Wheezing    Adhesive Tape-Silicones Rash     Paper tape only    Celebrex [Celecoxib] Other (comments)     Fatigue    Ketamine Other (comments)     \"out of body experience, hallucinations\"    Pravastatin Swelling     \"I retained fluid\"     FAMILY HISTORY:   Family History   Problem Relation Age of Onset    Other Mother         Unspecified respiratory problems    Lung Disease Mother     Cancer Father         brain    Diabetes Maternal Grandmother     Cancer Maternal Grandmother         colon    Cancer Paternal Grandmother         colon     SOCIAL HISTORY:  Social History     Tobacco Use    Smoking status: Never Smoker    Smokeless tobacco: Never Used   Substance Use Topics    Alcohol use: No      Social History     Substance and Sexual Activity   Drug Use No        SOCIAL HISTORY:  Social History     Social History Narrative    No hx of toxic industrial, environmental, asbestos or TB exposure. 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 Has lived in [place]. 2 indoor dogs. Review of Systems  ROS were reviewed, and all are negative outside the HPI.     Physical Examination:     Patient Vitals for the past 24 hrs:   BP Temp Pulse Resp SpO2   07/08/20 1356     94 %   07/08/20 1348 137/82  67 17 91 %   07/08/20 1330 126/60  64 17 93 %   07/08/20 1315 121/58  68 17 95 %   07/08/20 1300 119/60  69 17 96 %   07/08/20 1246 129/59  61 17 97 %   07/08/20 1236 139/60  63 17 96 %   07/08/20 1225 127/58  (!) 58 17 96 %   07/08/20 1220 92/55  65 17 95 %   07/08/20 1215 111/56  64 17 100 %   07/08/20 1210 106/51  67 18 99 %   07/08/20 1205 109/51  60 18 98 %   07/08/20 1200 100/61  69 18 99 %   07/08/20 1155 110/54  66 18 100 % 07/08/20 1151 127/63 98.4 °F (36.9 °C) 88 20 100 %   07/08/20 0934 136/63  (!) 58 16 99 %   07/08/20 0919 113/57  (!) 57 16 99 %   07/08/20 0914 119/59  (!) 56 16 99 %   07/08/20 0909 129/61  60 16 99 %   07/08/20 0904 139/62  (!) 57 16 97 %   07/08/20 0841 133/62  (!) 55 16 100 %   07/08/20 0810 145/74 98 °F (36.7 °C) 64 16 98 %     Height: 5' 1\" (154.9 cm)  Weight: 106.6 kg (235 lb)  BMI (calculated): 44.4  07/08 0701 - 07/08 1900  In: 1000 [I.V.:1000]  Out: 250       General appearance: Oriented, alert, cooperative, up in chair. Pain tolerable.  at bedside. Head: Normocephalic, without obvious abnormality, atraumatic  Neck: supple, symmetrical, trachea midline, no adenopathy, thyroid: not enlarged, symmetric, no tenderness/mass/nodules, no carotid bruit and no JVD  Lungs: clear to auscultation bilaterally  Heart: regular rate and rhythm, S1, S2 normal, no murmur, click, rub or gallop  Abdomen: soft, non-tender. Bowel sounds normal. No masses,  no organomegaly. Extremities: Dry, intact dressing to right knee with ice. All distal CMS intact. All other extremities normal, atraumatic, no cyanosis or edema  Skin: Skin color, texture, turgor normal. No rashes or lesions  Neurologic: Grossly normal      Labs:  Recent Results (from the past 24 hour(s))   GLUCOSE, POC    Collection Time: 07/08/20  8:31 AM   Result Value Ref Range    Glucose (POC) 108 (H) 65 - 100 mg/dL     Category Status: Full code     Assessment and Plan:   End stage OA right knee     S/P Right TKA:  Routine post op orders   Plans for home with home health    Probable PASTORA:  Noted in prehab    Use oxygen at 3 liters at  HS, oxygen saturation monitoring   PEP therapy qid, albuterol q 6 hours prn. Asthma:  Albuterol prn     Depression and anxiety:  Home meds     GERD:  Home meds     Obesity:  Diet     Thank you for involving us in the care of this pleasant patient. Will sign off,call if needed.       Signed By: Dontae Sharma, NP     July 8, 2020

## 2020-07-08 NOTE — PROGRESS NOTES
Problem: Mobility Impaired (Adult and Pediatric)  Goal: *Acute Goals and Plan of Care (Insert Text)  Description: GOALS (1-4 days):  (1.)Ms. Renae Heck will move from supine to sit and sit to supine  in bed with STAND BY ASSIST.  (2.)Ms. Renae Heck will transfer from bed to chair and chair to bed with STAND BY ASSIST using the least restrictive device. (3.)Ms. Renae Heck will ambulate with STAND BY ASSIST for 100 feet with the least restrictive device. (4.)Ms. Renae Heck will ambulate up/down 2 steps with left railing with MINIMAL ASSIST with device as needed. (5.)Ms. Renae Heck will increase right knee ROM to 5°-80°.  ________________________________________________________________________________________________     Outcome: Progressing Towards Goal     PHYSICAL THERAPY JOINT CAMP TKA: Initial Assessment and PM 7/8/2020  INPATIENT: Hospital Day: 1  Payor: Asad Delgadillo / Plan: [facility] / Product Type: PPO /      NAME/AGE/GENDER: Eunice Cloud is a 60 y.o. female   PRIMARY DIAGNOSIS:  Unilateral primary osteoarthritis, right knee [M17.11]   Procedure(s) and Anesthesia Type:     * RIGHT KNEE ARTHROPLASTY TOTAL ROBOTIC ASSISTED / HIEN/USAMA / [address] - Spinal (Right)  ICD-10: Treatment Diagnosis:    Pain in Right Knee (M25.561)  Stiffness of Right Knee, Not elsewhere classified (M25.661)  Difficulty in walking, Not elsewhere classified (R26.2)      ASSESSMENT:     Ms. Renae Heck presents with decreased functional mobility and gait as well as decreased rom and strength of right LE s/p right tka. She plans to go home with HHPT.     This section established at most recent assessment   PROBLEM LIST (Impairments causing functional limitations):  Decreased Strength  Decreased ADL/Functional Activities  Decreased Transfer Abilities  Decreased Ambulation Ability/Technique  Decreased Balance  Increased Pain  Decreased Activity Tolerance  Decreased Flexibility/Joint Mobility  Decreased Foster with Home Exercise Program   INTERVENTIONS PLANNED: (Benefits and precautions of physical therapy have been discussed with the patient.)  bed mobility  gait training  home exercise program (HEP)  Range of Motion: active/assisted/passive  Therapeutic Activities  therapeutic exercise/strengthening  transfer training  Group Therapy     TREATMENT PLAN: Frequency/Duration: Follow patient BID for duration of hospital stay to address above goals. Rehabilitation Potential For Stated Goals: Good     RECOMMENDED REHABILITATION/EQUIPMENT: (at time of discharge pending progress): Continue Skilled Therapy and Home Health: Physical Therapy. HISTORY:   History of Present Injury/Illness (Reason for Referral):  S/p right tka  Past Medical History/Comorbidities:   Ms. Broderick Pryor  has a past medical history of Arthritis, Asthma, Autoimmune disease ([facility] [address] (), Back pain (12/3/2012), Claustrophobia, Depression with anxiety, Elevated liver function tests (2015), GERD (gastroesophageal reflux disease), Hyperlipidemia (2015), Hypertension, Hypothyroidism, Menopause, Morbid obesity ([facility] [address] (2015), Oral ulcer (12/3/2012), Osteoarthritis (12/3/2012), Sciatica (12/3/2012), and Vitamin D deficiency (2015). She also has no past medical history of Unspecified adverse effect of anesthesia. Ms. Broderick Pryor  has a past surgical history that includes hx orthopaedic (Right, 2012); hx breast biopsy (Right, ); hx  section (, ); hx dilation and curettage (2019); colonoscopy (N/A, 2019); hx lymph node dissection (); hx rotator cuff repair (Left, ); hx knee replacement (Left, 2012); and ir inj epidural cerv/thor steroid wo img.   Social History/Living Environment:   Home Environment: Private residence  # Steps to Enter: 2  One/Two Story Residence: One story  Living Alone: No  Support Systems: Family member(s)  Patient Expects to be Discharged to[de-identified] Unknown  Current DME Used/Available at Home: None  Tub or Shower Type: Shower  Prior Level of Function/Work/Activity:  independent   Number of Personal Factors/Comorbidities that affect the Plan of Care: 1-2: MODERATE COMPLEXITY   EXAMINATION:   Most Recent Physical Functioning:      Gross Assessment  AROM: Within functional limits(left LE)  Strength: Generally decreased, functional(left LE)        RLE AROM  R Knee Flexion: 45  R Knee Extension: 10            Bed Mobility  Supine to Sit: Minimum assistance  Scooting: Minimum assistance    Transfers  Sit to Stand: Minimum assistance  Stand to Sit: Minimum assistance  Bed to Chair: Minimum assistance    Balance  Sitting: Intact  Standing: Pull to stand; With support              Weight Bearing Status  Right Side Weight Bearing: As tolerated  Distance (ft): 4 Feet (ft)  Ambulation - Level of Assistance: Minimal assistance  Assistive Device: Walker, rolling  Speed/Yecenia: Delayed  Step Length: Left shortened;Right shortened  Stance: Right decreased  Gait Abnormalities: Antalgic  Interventions: Safety awareness training;Verbal cues; Tactile cues;Manual cues     Braces/Orthotics:     Right Knee Cold  Type: Cryocuff      Body Structures Involved:  Bones  Joints  Muscles  Ligaments Body Functions Affected: Movement Related Activities and Participation Affected: Mobility   Number of elements that affect the Plan of Care: 3: MODERATE COMPLEXITY   CLINICAL PRESENTATION:   Presentation: Stable and uncomplicated: LOW COMPLEXITY   CLINICAL DECISION MAKING:   [facility] 6 Clicks   Basic Mobility Inpatient Short Form  How much difficulty does the patient currently have. .. Unable A Lot A Little None   1. Turning over in bed (including adjusting bedclothes, sheets and blankets)? [] 1   [] 2   [x] 3   [] 4   2. Sitting down on and standing up from a chair with arms ( e.g., wheelchair, bedside commode, etc.)   [] 1   [] 2   [x] 3   [] 4   3.   Moving from lying on back to sitting on the side of the bed? [] 1   [] 2   [x] 3   [] 4   How much help from another person does the patient currently need. .. Total A Lot A Little None   4. Moving to and from a bed to a chair (including a wheelchair)? [] 1   [] 2   [x] 3   [] 4   5. Need to walk in hospital room? [] 1   [x] 2   [] 3   [] 4   6. Climbing 3-5 steps with a railing? [] 1   [x] 2   [] 3   [] 4   © 2007, Trustees of [address] [place] [address] [employer] [facility]ly [facility], under license to [employer]. All rights reserved     Score:  Initial: 16 Most Recent: X (Date: -- )    Interpretation of Tool:  Represents activities that are increasingly more difficult (i.e. Bed mobility, Transfers, Gait). Medical Necessity:     Patient is expected to demonstrate progress in   strength, range of motion, and balance   to   decrease assistance required with exercises and functional mobility    . Reason for Services/Other Comments:  Patient   continues to require present interventions due to patient's inability to exercises and functional mobility independently   . Use of outcome tool(s) and clinical judgement create a POC that gives a: Clear prediction of patient's progress: LOW COMPLEXITY            TREATMENT:   (In addition to Assessment/Re-Assessment sessions the following treatments were rendered)     Pre-treatment Symptoms/Complaints:  knee pain  Pain Initial: 10     Post Session:  10     Assessment/Reassessment only, no treatment provided today    Date:   Date:   Date:     ACTIVITY/EXERCISE AM PM AM PM AM PM   GROUP THERAPY  []  []  []  []  []  []   Ankle Pumps  10       Quad Sets         Gluteal Sets         Hip ABd/ADduction         Straight Leg Raises         Knee Slides         Short Arc Quads         Long Arc Quads         Chair Slides                  B = bilateral; AA = active assistive; A = active; P = passive      Treatment/Session Assessment:     Response to Treatment:  pt. Did not do exercises or walk far due to pain, waiting on different meds.     Education:  [x] Home Exercises  [x] Fall Precautions  []  [] D/C Instruction Review  [x] Knee Prosthesis Review  [x] Walker Management/Safety [] Adaptive Equipment as Needed       Interdisciplinary Collaboration:   Occupational Therapist  Registered Nurse    After treatment position/precautions:   Up in chair  Bed/Chair-wheels locked  Bed in low position  Caregiver at bedside  Call light within reach  RN notified  Family at bedside    Compliance with Program/Exercises: Will assess as treatment progresses. No questions    Recommendations/Intent for next treatment session:  Treatment next visit will focus on increasing Ms. Mcdonald's independence with bed mobility, transfers, gait training, strength/ROM exercises, modalities for pain, and patient education.       Total Treatment Duration:  PT Patient Time In/Time Out  Time In: 1510  Time Out: 92 Keith Wagner PT

## 2020-07-08 NOTE — PROGRESS NOTES
Care Management Interventions  PCP Verified by CM: Yes  Mode of Transport at Discharge: Self  Transition of Care Consult (CM Consult): Discharge Planning  Discharge Durable Medical Equipment: Yes  Physical Therapy Consult: Yes  Occupational Therapy Consult: Yes  Current Support Network: Lives with Spouse  Confirm Follow Up Transport: Family  Discharge Location  Discharge Placement: Home with outpatient services    Patient is a 60y.o. year old female admitted for Right TKA . Patient plans to return home on discharge. Order received to arrange home health. Patient requesting [facility]. Referral sent to [facility] and accepted. Patient asking for a walker and RTS. Referral sent to [address] [place] [address]. delivered to the hospital room prior to discharge. Will follow until discharge.

## 2020-07-09 VITALS
OXYGEN SATURATION: 93 % | RESPIRATION RATE: 16 BRPM | HEART RATE: 77 BPM | SYSTOLIC BLOOD PRESSURE: 124 MMHG | HEIGHT: 61 IN | BODY MASS INDEX: 44.37 KG/M2 | TEMPERATURE: 98 F | WEIGHT: 235 LBS | DIASTOLIC BLOOD PRESSURE: 67 MMHG

## 2020-07-09 LAB — HGB BLD-MCNC: 11.2 G/DL (ref 11.7–15.4)

## 2020-07-09 PROCEDURE — 74011250637 HC RX REV CODE- 250/637: Performed by: ORTHOPAEDIC SURGERY

## 2020-07-09 PROCEDURE — 74011250636 HC RX REV CODE- 250/636: Performed by: ORTHOPAEDIC SURGERY

## 2020-07-09 PROCEDURE — 36415 COLL VENOUS BLD VENIPUNCTURE: CPT

## 2020-07-09 PROCEDURE — 97535 SELF CARE MNGMENT TRAINING: CPT

## 2020-07-09 PROCEDURE — 85018 HEMOGLOBIN: CPT

## 2020-07-09 PROCEDURE — 74011250637 HC RX REV CODE- 250/637: Performed by: PHYSICIAN ASSISTANT

## 2020-07-09 PROCEDURE — 97116 GAIT TRAINING THERAPY: CPT

## 2020-07-09 PROCEDURE — 97110 THERAPEUTIC EXERCISES: CPT

## 2020-07-09 PROCEDURE — 74011250636 HC RX REV CODE- 250/636: Performed by: PHYSICIAN ASSISTANT

## 2020-07-09 RX ADMIN — HYDROMORPHONE HYDROCHLORIDE 2 MG: 2 TABLET ORAL at 12:15

## 2020-07-09 RX ADMIN — Medication 2 G: at 01:40

## 2020-07-09 RX ADMIN — ACETAMINOPHEN 1000 MG: 500 TABLET, FILM COATED ORAL at 05:12

## 2020-07-09 RX ADMIN — SERTRALINE HYDROCHLORIDE 50 MG: 50 TABLET ORAL at 08:43

## 2020-07-09 RX ADMIN — MORPHINE SULFATE 8 MG: 10 INJECTION INTRAVENOUS at 01:40

## 2020-07-09 RX ADMIN — VALSARTAN 320 MG: 320 TABLET, FILM COATED ORAL at 08:44

## 2020-07-09 RX ADMIN — ACETAMINOPHEN 1000 MG: 500 TABLET, FILM COATED ORAL at 12:15

## 2020-07-09 RX ADMIN — HYDROMORPHONE HYDROCHLORIDE 2 MG: 2 TABLET ORAL at 08:44

## 2020-07-09 RX ADMIN — ASPIRIN 81 MG: 81 TABLET, COATED ORAL at 08:43

## 2020-07-09 RX ADMIN — LEVOTHYROXINE SODIUM 88 MCG: 88 TABLET ORAL at 05:12

## 2020-07-09 RX ADMIN — DOCUSATE SODIUM 50MG AND SENNOSIDES 8.6MG 2 TABLET: 8.6; 5 TABLET, FILM COATED ORAL at 08:42

## 2020-07-09 RX ADMIN — FAMOTIDINE 40 MG: 20 TABLET, FILM COATED ORAL at 08:44

## 2020-07-09 RX ADMIN — HYDROMORPHONE HYDROCHLORIDE 2 MG: 2 TABLET ORAL at 05:12

## 2020-07-09 RX ADMIN — Medication 1 AMPULE: at 08:44

## 2020-07-09 RX ADMIN — POTASSIUM CHLORIDE 20 MEQ: 1500 TABLET, EXTENDED RELEASE ORAL at 08:43

## 2020-07-09 RX ADMIN — HYDROCHLOROTHIAZIDE 12.5 MG: 25 TABLET ORAL at 08:43

## 2020-07-09 NOTE — PROGRESS NOTES
Problem: Mobility Impaired (Adult and Pediatric)  Goal: *Acute Goals and Plan of Care (Insert Text)  Description: GOALS (1-4 days):  (1.)Ms. Hammad Rock will move from supine to sit and sit to supine  in bed with STAND BY ASSIST.  (2.)Ms. Hammad Rock will transfer from bed to chair and chair to bed with STAND BY ASSIST using the least restrictive device. Goal met  (3.)Ms. Hammad Rock will ambulate with STAND BY ASSIST for 100 feet with the least restrictive device. goalmet  (4.)Ms. Hammad Rock will ambulate up/down 2 steps with left railing with MINIMAL ASSIST with device as needed. (5.)Ms. Hammad Rock will increase right knee ROM to 5°-80°.  ________________________________________________________________________________________________     Outcome: Progressing Towards Goal     PHYSICAL THERAPY JOINT CAMP TKA: Daily Note and AM 7/9/2020  INPATIENT: Hospital Day: 2  Payor: Jyotsna Saini / Plan: [facility] / Product Type: PPO /      NAME/AGE/GENDER: Mikel Limon is a 60 y.o. female   PRIMARY DIAGNOSIS:  Unilateral primary osteoarthritis, right knee [M17.11]   Procedure(s) and Anesthesia Type:     * RIGHT KNEE ARTHROPLASTY TOTAL ROBOTIC ASSISTED / HIEN/USAMA / [address] - Spinal (Right)  ICD-10: Treatment Diagnosis:    · Pain in Right Knee (M25.561)  · Stiffness of Right Knee, Not elsewhere classified (M25.661)  · Difficulty in walking, Not elsewhere classified (R26.2)      ASSESSMENT:     Ms. Hammad Rock presents with decreased functional mobility and gait as well as decreased rom and strength of right LE s/p right tka. She plans to go home with HHPT. Sitting up in chair on contact. Hurting but willing to partcipate. She plans on going home after morning therapy. She did not want to practice stairs. Increased her gait distance today. This section established at most recent assessment   PROBLEM LIST (Impairments causing functional limitations):  1. Decreased Strength  2.  Decreased ADL/Functional Activities  3. Decreased Transfer Abilities  4. Decreased Ambulation Ability/Technique  5. Decreased Balance  6. Increased Pain  7. Decreased Activity Tolerance  8. Decreased Flexibility/Joint Mobility  9. Decreased [place] with Home Exercise Program   INTERVENTIONS PLANNED: (Benefits and precautions of physical therapy have been discussed with the patient.)  1. bed mobility  2. gait training  3. home exercise program (HEP)  4. Range of Motion: active/assisted/passive  5. Therapeutic Activities  6. therapeutic exercise/strengthening  7. transfer training  8. Group Therapy     TREATMENT PLAN: Frequency/Duration: Follow patient BID for duration of hospital stay to address above goals. Rehabilitation Potential For Stated Goals: Good     RECOMMENDED REHABILITATION/EQUIPMENT: (at time of discharge pending progress): Continue Skilled Therapy and Home Health: Physical Therapy. HISTORY:   History of Present Injury/Illness (Reason for Referral):  S/p right tka  Past Medical History/Comorbidities:   Ms. Funmi Salinas  has a past medical history of Arthritis, Asthma, Autoimmune disease ([facility] [address] (), Back pain (12/3/2012), Claustrophobia, Depression with anxiety, Elevated liver function tests (2015), GERD (gastroesophageal reflux disease), Hyperlipidemia (2015), Hypertension, Hypothyroidism, Menopause, Morbid obesity ([address] (2015), Oral ulcer (12/3/2012), Osteoarthritis (12/3/2012), Sciatica (12/3/2012), and Vitamin D deficiency (2015). She also has no past medical history of Unspecified adverse effect of anesthesia.   Ms. Funmi Salinas  has a past surgical history that includes hx orthopaedic (Right, 2012); hx breast biopsy (Right, ); hx  section (, ); hx dilation and curettage (2019); colonoscopy (N/A, 2019); hx lymph node dissection (); hx rotator cuff repair (Left, ); hx knee replacement (Left, 2012); and ir inj epidural cerv/thor steroid wo img.  Social History/Living Environment:   Home Environment: Private residence  # Steps to Enter: 2  One/Two Story Residence: One story  Living Alone: No  Support Systems: Family member(s)  Patient Expects to be Discharged to[de-identified] Unknown  Current DME Used/Available at Home: None  Tub or Shower Type: Shower  Prior Level of Function/Work/Activity:  independent   Number of Personal Factors/Comorbidities that affect the Plan of Care: 1-2: MODERATE COMPLEXITY   EXAMINATION:   Most Recent Physical Functioning:                            Bed Mobility  Supine to Sit: Stand-by assistance    Transfers  Sit to Stand: Stand-by assistance  Stand to Sit: Stand-by assistance  Bed to Chair: Stand-by assistance    Balance  Sitting: Intact  Standing: Pull to stand; With support              Weight Bearing Status  Right Side Weight Bearing: As tolerated  Distance (ft): 140 Feet (ft)  Ambulation - Level of Assistance: Stand-by assistance  Assistive Device: Walker, rolling  Speed/Yecenia: Pace decreased (<100 feet/min)  Step Length: Left shortened  Stance: Right decreased  Gait Abnormalities: Antalgic;Decreased step clearance; Step to gait  Interventions: Verbal cues; Safety awareness training     Braces/Orthotics:     Right Knee Cold  Type: Cryocuff      Body Structures Involved:  1. Bones  2. Joints  3. Muscles  4. Ligaments Body Functions Affected:  1. Movement Related Activities and Participation Affected:  1. Mobility   Number of elements that affect the Plan of Care: 3: MODERATE COMPLEXITY   CLINICAL PRESENTATION:   Presentation: Stable and uncomplicated: LOW COMPLEXITY   CLINICAL DECISION MAKIN [facility] [address] [address] AM-PAC 6 Clicks   Basic Mobility Inpatient Short Form  How much difficulty does the patient currently have. .. Unable A Lot A Little None   1. Turning over in bed (including adjusting bedclothes, sheets and blankets)? [] 1   [] 2   [x] 3   [] 4   2.   Sitting down on and standing up from a chair with arms ( e.g., wheelchair, bedside commode, etc.)   [] 1   [] 2   [x] 3   [] 4   3. Moving from lying on back to sitting on the side of the bed? [] 1   [] 2   [x] 3   [] 4   How much help from another person does the patient currently need. .. Total A Lot A Little None   4. Moving to and from a bed to a chair (including a wheelchair)? [] 1   [] 2   [x] 3   [] 4   5. Need to walk in hospital room? [] 1   [x] 2   [] 3   [] 4   6. Climbing 3-5 steps with a railing? [] 1   [x] 2   [] 3   [] 4   © 2007, Trustees of [address] [place] [address] [address] [address], under license to [employer]. All rights reserved     Score:  Initial: 16 Most Recent: X (Date: -- )    Interpretation of Tool:  Represents activities that are increasingly more difficult (i.e. Bed mobility, Transfers, Gait). Medical Necessity:     · Patient is expected to demonstrate progress in   · strength, range of motion, and balance  ·  to   · decrease assistance required with exercises and functional mobility    · . Reason for Services/Other Comments:  · Patient   · continues to require present interventions due to patient's inability to exercises and functional mobility independently   · . Use of outcome tool(s) and clinical judgement create a POC that gives a: Clear prediction of patient's progress: LOW COMPLEXITY            TREATMENT:   (In addition to Assessment/Re-Assessment sessions the following treatments were rendered)     Pre-treatment Symptoms/Complaints:  knee pain  Pain Initial: 10     Post Session:  10     Gait Training (15 Minutes):  Gait training to improve and/or restore physical functioning as related to mobility and strength. Ambulated 140 Feet (ft) with Stand-by assistance using a Walker, rolling and minimal Verbal cues; Safety awareness training related to their stance phase to promote proper body alignment. Therapeutic Exercise: (10 Minutes):  Exercises per grid below to improve mobility and strength.   Required minimal verbal cues to promote proper body alignment. Date:   Date:   Date:     ACTIVITY/EXERCISE AM PM AM PM AM PM   GROUP THERAPY  []  []  []  []  []  []   Ankle Pumps  10       Quad Sets  10       Gluteal Sets  10       Hip ABd/ADduction  10       Straight Leg Raises  10       Knee Slides  10       Short Arc Quads         Long Arc Quads         Chair Slides                  B = bilateral; AA = active assistive; A = active; P = passive      Treatment/Session Assessment:     Response to Treatment: did well without problems. Needed encouragement to push through the pain. Education:  [x] Home Exercises  [x] Fall Precautions  []  [x] D/C Instruction Review  [x] Knee Prosthesis Review  [x] Walker Management/Safety [] Adaptive Equipment as Needed       Interdisciplinary Collaboration:   o Physical Therapist  o Registered Nurse    After treatment position/precautions:   o Up in chair  o Bed/Chair-wheels locked  o Bed in low position  o Call light within reach  o RN notified    Compliance with Program/Exercises: Will assess as treatment progresses. No questions    Recommendations/Intent for next treatment session:  Treatment next visit will focus on increasing Ms. Mcdonald's independence with bed mobility, transfers, gait training, strength/ROM exercises, modalities for pain, and patient education.       Total Treatment Duration:  PT Patient Time In/Time Out  Time In: 1025  Time Out: [id number] [facility],

## 2020-07-09 NOTE — PROGRESS NOTES
Pt discharge summary and home medication sheet reviewed and signed by pt. Copy given for take home use. RX for po Dilaudid given. All goals met. Assessment unchanged. Pt waiting for discharge when family arrives.

## 2020-07-09 NOTE — PROGRESS NOTES
2020         Post Op day: 1 Day Post-OpProcedure(s) (LRB):  RIGHT KNEE ARTHROPLASTY TOTAL ROBOTIC ASSISTED / HIEN/USAMA / ADDUCTOR CANAL BLOCK (Right)      Admit Date: 2020  Admit Diagnosis: Unilateral primary osteoarthritis, right knee [M17.11]  Osteoarthritis of left knee [M17.12]    LAB:    Recent Results (from the past 24 hour(s))   GLUCOSE, POC    Collection Time: 20  8:31 AM   Result Value Ref Range    Glucose (POC) 108 (H) 65 - 100 mg/dL   HEMOGLOBIN    Collection Time: 20  9:25 PM   Result Value Ref Range    HGB 11.7 11.7 - 15.4 g/dL   HEMOGLOBIN    Collection Time: 20  3:42 AM   Result Value Ref Range    HGB 11.2 (L) 11.7 - 15.4 g/dL     Vital Signs:    Patient Vitals for the past 8 hrs:   BP Temp Pulse Resp SpO2   20 0655 124/67 98 °F (36.7 °C) 77 16 93 %   20 0342 107/77 98 °F (36.7 °C) 77 16 93 %   20 2338 118/66 97.8 °F (36.6 °C) 68 18 97 %     Temp (24hrs), Av °F (36.7 °C), Min:97.4 °F (36.3 °C), Max:98.4 °F (36.9 °C)    Body mass index is 44.4 kg/m².   Pain Control:   Pain Assessment  Pain Scale 1: Numeric (0 - 10)  Pain Intensity 1: 2  Pain Onset 1: at rest  Pain Location 1: Back, Knee  Pain Orientation 1: Lower, Right  Pain Description 1: Constant  Pain Intervention(s) 1: Repositioned    Subjective: Doing well, No complaints, No SOB, No Chest Pain, No nausea or vomiting     Objective: Vital Signs are Stable, No Acute Distress, Alert and Oriented, Dressing is dry,  Neurovascular exam is normal.       PT/OT:            Assistive Device: Walker (comment)  RLE AROM  R Knee Flexion: 45  R Knee Extension: 10             Wieght Bearing Status: WBAT    Meds:  [unfilled]  [unfilled]  [unfilled]    Assessment:   Patient Active Problem List   Diagnosis Code    Osteoarthritis of left knee M17.12    S/P total knee replacement using cement Z96.659    HTN (hypertension) I10    Anxiety F41.9    Hyperlipidemia E78.5    Hypertension I10    Bronchitis J40  Sicca syndrome (HCC) M35.00    Hypothyroidism E03.9    GERD (gastroesophageal reflux disease) K21.9    Back pain M54.9    Sciatica M54.30    Osteoarthritis M19.90    Knee joint replacement status Z96.659    Hair loss L65.9    Encounter for long-term (current) use of other medications Z79.899    Snoring R06.83    Fatigue R53.83    Sleep disorder G47.9    Obesity E66.9    Allergic rhinitis J30.9    Hypersomnolence G47.10    Asthma J45.909    Cholecystitis with cholelithiasis K80.10    Cervical radicular pain M54.12    Elevated liver function tests R79.89    Hypothyroidism, adult E03.9    Vitamin D deficiency E55.9    Obesity, morbid (HCC) E66.01    Mild intermittent asthma without complication C76.49    Suspected sleep apnea R29.818    Status post right knee replacement Z96.651             Plan: Continue Physical Therapy, Monitor labs, home today.         Signed By: Nikita Connelly MD

## 2020-07-09 NOTE — PROGRESS NOTES
Problem: Self Care Deficits Care Plan (Adult)  Goal: *Acute Goals and Plan of Care (Insert Text)  Description: GOALS:   DISCHARGE GOALS (in preparation for going home/rehab):  3 days all goals met  7/9/2020  1. Ms. Minesh Nova will perform one lower body dressing activity with minimal assistance required to demonstrate improved functional mobility and safety. 2.  Ms. Minesh Nova will perform one lower body bathing activity with minimal assistance required to demonstrate improved functional mobility and safety. 3.  Ms. Minesh Nova will perform toileting/toilet transfer with contact guard assistance to demonstrate improved functional mobility and safety. 4.  Ms. Minesh Nova will perform shower transfer with contact guard assistance to demonstrate improved functional mobility and safety. Outcome: Progressing Towards Goal     JOINT CAMP OCCUPATIONAL THERAPY TKA: Daily Note, Discharge and AM 7/9/2020  INPATIENT: Hospital Day: 2  Payor: Keli Rodríguez / Plan: [address] / Product Type: PPO /      NAME/AGE/GENDER: Desiree Cohen is a 60 y.o. female   PRIMARY DIAGNOSIS:  Unilateral primary osteoarthritis, right knee [M17.11]   Procedure(s) and Anesthesia Type:     * RIGHT KNEE ARTHROPLASTY TOTAL ROBOTIC ASSISTED / HIEN/USAMA / [address] - Spinal (Right)  ICD-10: Treatment Diagnosis:    · Pain in Right Knee (M25.561)  · Stiffness of Right Knee, Not elsewhere classified (M25.661)  · Other lack of cordination (R27.8)  · Difficulty in walking, Not elsewhere classified (R26.2)  · Other abnormalities of gait and mobility (R26.89)      ASSESSMENT:     Ms. Minesh Nova is s/p R TKA and presents with decreased weight bearing on R LE and decreased independence with functional mobility and activities of daily living.   Patient completed shower and dressing as charter below in ADL grid and is ambulating with rolling walker and contact guard assist.  Patient has met 4/4 goals and plans to return home with good family support. Family able to provide patient with appropriate level of assistance at this time. OT reviewed safety precautions throughout session and therapy schedule for the remainder of today. Patient instructed to call for assistance when needing to get up from recliner and all needs in reach. Patient verbalized understanding of call light. This section established at most recent assessment   PROBLEM LIST (Impairments causing functional limitations):  1. Decreased Strength  2. Decreased ADL/Functional Activities  3. Decreased Transfer Abilities  4. Increased Pain  5. Increased Fatigue  6. Decreased Flexibility/Joint Mobility  7. Decreased Knowledge of Precautions   INTERVENTIONS PLANNED: (Benefits and precautions of occupational therapy have been discussed with the patient.)  1. Activities of daily living training  2. Adaptive equipment training  3. Balance training  4. Clothing management  5. Donning&doffing training  6. Theraputic activity     TREATMENT PLAN: Frequency/Duration: Follow patient 2 times to address above goals. Rehabilitation Potential For Stated Goals: Good     RECOMMENDED REHABILITATION/EQUIPMENT: (at time of discharge pending progress): Continue Skilled Therapy. OCCUPATIONAL PROFILE AND HISTORY:   History of Present Injury/Illness (Reason for Referral): Pt presents this date s/p (right) TKA. Past Medical History/Comorbidities:   Ms. Lucretia Brewer  has a past medical history of Arthritis, Asthma, Autoimmune disease ([facility] [address] (2017), Back pain (12/3/2012), Claustrophobia, Depression with anxiety, Elevated liver function tests (04/29/2015), GERD (gastroesophageal reflux disease), Hyperlipidemia (07/06/2015), Hypertension, Hypothyroidism, Menopause, Morbid obesity ([facility] [address] (07/06/2015), Oral ulcer (12/3/2012), Osteoarthritis (12/3/2012), Sciatica (12/3/2012), and Vitamin D deficiency (12/30/2015).  She also has no past medical history of Unspecified adverse effect of anesthesia. Ms. Justus Naik  has a past surgical history that includes hx orthopaedic (Right, 2012); hx breast biopsy (Right, ); hx  section (, ); hx dilation and curettage (2019); colonoscopy (N/A, 2019); hx lymph node dissection (); hx rotator cuff repair (Left, ); hx knee replacement (Left, 2012); and ir inj epidural cerv/thor steroid wo img. Social History/Living Environment:   Home Environment: Private residence  # Steps to Enter: 2  One/Two Story Residence: One story  Living Alone: No  Support Systems: Family member(s)  Patient Expects to be Discharged to[de-identified] Unknown  Current DME Used/Available at Home: None  Tub or Shower Type: Shower  Prior Level of Function/Work/Activity:  Mod i     Number of Personal Factors/Comorbidities that affect the Plan of Care: Brief history (0):  LOW COMPLEXITY   ASSESSMENT OF OCCUPATIONAL PERFORMANCE[de-identified]   Most Recent Physical Functioning:   Balance  Sitting: Intact  Standing: With support                              Mental Status  Neurologic State: Alert  Orientation Level: Oriented X4  Cognition: Follows commands  Perception: Appears intact  Perseveration: No perseveration noted  Safety/Judgement: Fall prevention                Basic ADLs (From Assessment) Complex ADLs (From Assessment)   Basic ADL  Feeding: Supervision  Oral Facial Hygiene/Grooming: Supervision  Bathing: Moderate assistance  Type of Bath: Chlorhexidine (CHG), Full, Shower  Upper Body Dressing: Supervision  Lower Body Dressing: Maximum assistance  Toileting:  Moderate assistance     Grooming/Bathing/Dressing Activities of Daily Living   Grooming  Grooming Assistance: Set-up  Position Performed: Standing  Brushing Teeth: Set-up  Brushing/Combing Hair: Set-up Cognitive Retraining  Safety/Judgement: Fall prevention   Upper Body Bathing  Bathing Assistance: Set-up  Position Performed: Seated in chair  Cues: Verbal cues provided Feeding  Feeding Assistance: Set-up   Lower Body Bathing  Bathing Assistance: Set-up  Perineal  : Set-up  Position Performed: Seated in chair  Cues: Verbal cues provided  Adaptive Equipment: Grab bar  Lower Body : Set-up  Position Performed: Seated in chair  Cues: Verbal cues provided  Adaptive Equipment: Chrissy Hack  Toileting Assistance: Set-up  Bladder Hygiene: Set-up  Clothing Management: Set-up   Upper Body Dressing Assistance  Dressing Assistance: Set-up  Bra: Set-up  Pullover Shirt: Set-up Functional Transfers  Bathroom Mobility: Contact guard assistance  Toilet Transfer : Contact guard assistance  Shower Transfer: Contact guard assistance  Adaptive Equipment: Austin Flicker (comment)   Lower Body Dressing Assistance  Dressing Assistance: Set-up  Underpants: Set-up  Pants With Elastic Waist: Set-up  Socks: Compensatory technique training( socks) Bed/Mat Mobility  Supine to Sit: Stand-by assistance  Sit to Stand: Contact guard assistance  Stand to Sit: Contact guard assistance  Bed to Chair: Contact guard assistance         Physical Skills Involved:  1. Strength  2. Activity Tolerance  3. Pain (acute) Cognitive Skills Affected (resulting in the inability to perform in a timely and safe manner): 1. none  Psychosocial Skills Affected:  1. none    Number of elements that affect the Plan of Care: 1-3:  LOW COMPLEXITY   CLINICAL DECISION MAKIN [facility] [address] [address] AM-PAC 6 Clicks   Daily Activity Inpatient Short Form  How much help from another person does the patient currently need. .. Total A Lot A Little None   1. Putting on and taking off regular lower body clothing? [] 1   [] 2   [x] 3   [] 4   2. Bathing (including washing, rinsing, drying)? [] 1   [] 2   [] 3   [x] 4   3. Toileting, which includes using toilet, bedpan or urinal?   [] 1   [] 2   [] 3   [x] 4   4. Putting on and taking off regular upper body clothing? [] 1   [] 2   [] 3   [x] 4   5. Taking care of personal grooming such as brushing teeth? [] 1   [] 2   [] 3   [x] 4   6. Eating meals? [] 1   [] 2   [] 3   [x] 4   © 2007, Trustees of [address] [place] [address] [address] [address], under license to [employer]. All rights reserved     Score:  Initial: 18 Most Recent: 23 (Date: 7-9-20 )    Interpretation of Tool:  Represents activities that are increasingly more difficult (i.e. Bed mobility, Transfers, Gait). Medical Necessity:     · Patient is expected to demonstrate progress in   · Self care skills and functional mobility  ·     Reason for Services/Other Comments:  · Patient continues to require skilled intervention due to   · Above listed deficits     Use of outcome tool(s) and clinical judgement create a POC that gives a: LOW COMPLEXITY            TREATMENT:   (In addition to Assessment/Re-Assessment sessions the following treatments were rendered)     Pre-treatment Symptoms/Complaints: Tolerated shower  Pain: Initial: 3     Post Session:  3/10      Self Care: (40): Procedure(s) (per grid) utilized to improve and/or restore self-care/home management as related to dressing, bathing, toileting and grooming. Required minimal verbal and   cueing to facilitate activities of daily living skills and compensatory activities. Treatment/Session Assessment:     Response to Treatment:  tolerated well    Education:  [] Home Exercises  [x] Fall Precautions  [] Hip Precautions [] Going Home Video  [x] Knee/Hip Prosthesis Review  [x] Walker Management/Safety [x] Adaptive Equipment as Needed       Interdisciplinary Collaboration:   o Physical Therapist  o Occupational Therapist  o Registered Nurse    After treatment position/precautions:   o Up in chair  o Bed/Chair-wheels locked  o Call light within reach  o RN notified  o LEs reclined     Compliance with Program/Exercises: Compliant all of the time. Recommendations/Intent for next treatment session: Pt doing well all goals met and will do well at home with support from family. Patient will be discharged home with home health PT.  No further Occupational Therapy warranted, will discharge Occupational Therapy services.        Total Treatment Duration:  OT Patient Time In/Time Out  Time In: 0800  Time Out: 7447 Chetna Naik OT

## 2020-07-09 NOTE — DISCHARGE INSTRUCTIONS
[facility]   Patient Discharge Instructions    Desiree Cohen / [id number] : 1955    Admitted 2020 Discharged: 2020     IF YOU HAVE ANY PROBLEMS ONCE YOU ARE AT HOME CALL THE FOLLOWING NUMBERS:   Main office number: [phone]      Medications    · The medications you are to continue on are listed on the medication reconciliation sheet. · Narcotic pain medications as well as supplemental iron can cause constipation. If this occurs try stopping the narcotic pain medication and/or the iron. · It is important that you take the medication exactly as they are prescribed. · Medications which increase your risk of blood clots are listed to stop for 5 weeks after surgery as well as medications or supplements which increase your risk of bleeding complications. · Keep your medication in the bottles provided by the pharmacist and keep a list of the medication names, dosages, and times to be taken in your wallet. · Do not take other medications without consulting your doctor. Important Information    Do NOT smoke as this will greatly increase your risk of infection! Resume your prehospital diet. If you have excessive nausea or vomitting call your doctor's office     Leg swelling and warmth is normal for 6 months after surgery. If you experience swelling in your leg elevate you leg while laying down with your toes above your heart. If you have sudden onset severe swelling with leg pain call our office. The stitches deep inside take approximately 6 months to dissolve. There will be sharp shooting, stinging and burning pain. This is normal and will resolve between 3-6 months after surgery. Difficulty sleeping is normal following total Knee and Hip replacement. You may try melatonin, an over-the-counter sleep aid or benadryl to help with sleep. Most patients will resume sleeping through the night 8 weeks after surgery. Home Physical Therapy is arranged.  Home [facility] will contact you within 48 hrs of discharge that you have chosen. If you have not received a call within this time frame please contact that provider you chose. You should be given this information before you leave the hospital.     You are at a risk for falls. Use the rolling walker when walking. Patients who have had a joint replacement should not drive if they are still taking narcotic pain mediation during the daytime hours. Most patients wean themselves off of pain medication within 2-5 weeks after surgery. When to Call the office    - If you have a temperature greater then 101  - Uncontrolled vomiting   - Loose control of your bladder or bowel function  - Are unable to bear any weight   - Need a pain medication refill       DISCHARGE SUMMARY from Nurse    The following personal items collected during your admission are returned to you:   Dental Appliance: Dental Appliances: None  Vision: Visual Aid: Glasses  Hearing Aid:    Jewelry: Jewelry: None  Clothing: Clothing: None  Other Valuables: Other Valuables: Wallet, Cell Phone, Purse(with Jorge Luis)  Valuables sent to safe:      PATIENT INSTRUCTIONS:    After general anesthesia or intravenous sedation, for 24 hours or while taking prescription Narcotics:  · Limit your activities  · Do not drive and operate hazardous machinery  · Do not make important personal or business decisions  · Do  not drink alcoholic beverages  · If you have not urinated within 8 hours after discharge, please contact your surgeon on call.     Report the following to your surgeon:  · Excessive pain, swelling, redness or odor of or around the surgical area  · Temperature over 101  · Nausea and vomiting lasting longer than 4 hours or if unable to take medications  · Any signs of decreased circulation or nerve impairment to extremity: change in color, persistent  numbness, tingling, coldness or increase pain  · Any questions, call office @ [phone]      Keep scheduled follow up appointment. If need to change, call office @ [phone]. *  Please give a list of your current medications to your Primary Care Provider. *  Please update this list whenever your medications are discontinued, doses are      changed, or new medications (including over-the-counter products) are added. *  Please carry medication information at all times in case of emergency situations. Patient Education        Total Knee Replacement: What to Expect at [address] [place] [address] Drive had a total knee replacement. The doctor replaced the worn ends of the bones that connect to your knee (thighbone and lower leg bone) with plastic and metal parts. When you leave the hospital, you should be able to move around with a walker or crutches. But you will need someone to help you at home for the next few weeks or until you have more energy and can move around better. If you need more extensive rehab, you may go to a specialized rehab center for more treatment. You will go home with a bandage and stitches, staples, tissue glue, or tape strips. Change the bandage as your doctor tells you to. If you have stitches or staples, your doctor will remove them 10 to 21 days after your surgery. Glue or tape strips will fall off on their own over time. You may still have some mild pain, and the area may be swollen for 3 to 6 months after surgery. Your knee will continue to improve for 6 to 12 months. You will probably use a walker for 1 to 3 weeks and then use crutches. When you are ready, you can use a cane. You will probably be able to walk on your own in 4 to 8 weeks. You will need to do months of physical rehabilitation (rehab) after a knee replacement. Rehab will help you strengthen the muscles of the knee and help you regain movement. After you recover, your artificial knee will allow you to do normal daily activities with less pain or no pain at all. You may be able to hike, dance, ride a bike, and play golf.  Talk to your doctor about whether you can do more strenuous activities. Always tell your caregivers that you have an artificial knee. How long it will take to walk on your own, return to normal activities, and go back to work depends on your health and how well your rehabilitation (rehab) program goes. The better you do with your rehab exercises, the quicker you will get your strength and movement back. This care sheet gives you a general idea about how long it will take for you to recover. But each person recovers at a different pace. Follow the steps below to get better as quickly as possible. How can you care for yourself at home? Activity  · Rest when you feel tired. You may take a nap, but don't stay in bed all day. When you sit, use a chair with arms. You can use the arms to help you stand up. · Work with your physical therapist to find the best way to exercise. What you can do as your knee heals will depend on whether your new knee is cemented or uncemented. You may not be able to do certain things for a while if your new knee is uncemented. · After your knee has healed enough, you can do more strenuous activities with caution. ? You can golf, but use a golf cart. And don't wear shoes with spikes. ? You can bike on a flat road or on a stationary bike. Avoid biking up hills. ? Your doctor may suggest that you stay away from activities that put stress on your knee. These include tennis, badminton, squash, racquetball, contact sports like football, jumping (such as in basketball), jogging, and running. ? Avoid activities where you might fall. These include horseback riding, skiing, and mountain biking. · Do not sit for more than 1 hour at a time. Get up and walk around for a while before you sit again. If you must sit for a long time, prop up your leg with a chair or footstool. This will help you avoid swelling. · Ask your doctor when you can drive again.  It may take up to 8 weeks after knee replacement surgery before it's safe for you to drive. · When you get into a car, sit on the edge of the seat. Then pull in your legs, and turn to face the front. · You should be able to do many everyday activities 3 to 6 weeks after your surgery. You will probably need to take 4 to 16 weeks off from work. When you can go back to work depends on the type of work you do and how you feel. · Ask your doctor when it is okay for you to have sex. · For 12 weeks, do not lift anything heavier than 10 pounds and do not lift weights. Diet  · By the time you leave the hospital, you should be eating your normal diet. If your stomach is upset, try bland, low-fat foods like plain rice, broiled chicken, toast, and yogurt. Your doctor may suggest that you take iron and vitamin supplements. · Drink plenty of fluids (unless your doctor tells you not to). · Eat healthy foods, and watch your portion sizes. Try to stay at your ideal weight. Too much weight puts more stress on your new knee. · You may notice that your bowel movements are not regular right after your surgery. This is common. Try to avoid constipation and straining with bowel movements. You may want to take a fiber supplement every day. If you have not had a bowel movement after a couple of days, ask your doctor about taking a mild laxative. Medicines  · Your doctor will tell you if and when you can restart your medicines. He or she will also give you instructions about taking any new medicines. · If you take aspirin or some other blood thinner, ask your doctor if and when to start taking it again. Make sure that you understand exactly what your doctor wants you to do. · Your doctor may give you a blood-thinning medicine to prevent blood clots. If you take a blood thinner, be sure you get instructions about how to take your medicine safely. Blood thinners can cause serious bleeding problems. This medicine could be in pill form or as a shot (injection).  If a shot is needed, your doctor will tell you how to do this. · Be safe with medicines. Take pain medicines exactly as directed. ? If the doctor gave you a prescription medicine for pain, take it as prescribed. ? If you are not taking a prescription pain medicine, ask your doctor if you can take an over-the-counter medicine. ? Plan to take your pain medicine 30 minutes before exercises. It is easier to prevent pain before it starts than to stop it after it has started. · If you think your pain medicine is making you sick to your stomach:  ? Take your medicine after meals (unless your doctor has told you not to). ? Ask your doctor for a different pain medicine. · If your doctor prescribed antibiotics, take them as directed. Do not stop taking them just because you feel better. You need to take the full course of antibiotics. Incision care  · If your doctor told you how to care for your cut (incision), follow your doctor's instructions. You will have a dressing over the cut. A dressing helps the incision heal and protects it. Your doctor will tell you how to take care of this. · If you did not get instructions, follow this general advice:  ? If you have strips of tape on the cut the doctor made, leave the tape on for a week or until it falls off.  ? If you have stitches or staples, your doctor will tell you when to come back to have them removed. ? If you have skin adhesive on the cut, leave it on until it falls off. Skin adhesive is also called glue or liquid stitches. ? Change the bandage every day. ? Wash the area daily with warm water, and pat it dry. Don't use hydrogen peroxide or alcohol. They can slow healing. ? You may cover the area with a gauze bandage if it oozes fluid or rubs against clothing. ? You may shower 24 to 48 hours after surgery. Pat the incision dry. Don't swim or take a bath for the first 2 weeks, or until your doctor tells you it is okay.   Exercise  · Your rehab program will give you a number of exercises to do to help you get back your knee's range of motion and strength. Always do them as your therapist tells you. Ice  · For pain and swelling, put ice or a cold pack on the area for 10 to 20 minutes at a time. Put a thin cloth between the ice and your skin. Other instructions  · Keep wearing your support stockings as your doctor says. These help to prevent blood clots. How long you'll have to wear them depends on your activity level and the amount of swelling. · Carry a medical alert card that says you have an artificial joint. You have metal pieces in your knee. These may set off some airport metal detectors. Follow-up care is a key part of your treatment and safety. Be sure to make and go to all appointments, and call your doctor if you are having problems. It's also a good idea to know your test results and keep a list of the medicines you take. When should you call for help? [id number] anytime you think you may need emergency care. For example, call if:  · You passed out (lost consciousness). · You have severe trouble breathing. · You have sudden chest pain and shortness of breath, or you cough up blood. Call your doctor now or seek immediate medical care if:  · You have signs of infection, such as:  ? Increased pain, swelling, warmth, or redness. ? Red streaks leading from the incision. ? Pus draining from the incision. ? A fever. · You have signs of a blood clot, such as:  ? Pain in your calf, back of the knee, thigh, or groin. ? Redness and swelling in your leg or groin. · Your incision comes open and begins to bleed, or the bleeding increases. · You have pain that does not get better after you take pain medicine. Watch closely for changes in your health, and be sure to contact your doctor if:  · You do not have a bowel movement after taking a laxative. Where can you learn more?   Go to http://eugene-neal.info/  Enter T054 in the search box to learn more about \"Total Knee Replacement: What to Expect at Home. \"  Current as of: March 2, 2020               Content Version: 12.5  © 2006-2020 [employer]. Care instructions adapted under license by [employer] (which disclaims liability or warranty for this information). If you have questions about a medical condition or this instruction, always ask your healthcare professional. [address] any warranty or liability for your use of this information. These are general instructions for a healthy lifestyle:    No smoking/ No tobacco products/ Avoid exposure to second hand smoke    Surgeon General's Warning:  Quitting smoking now greatly reduces serious risk to your health. Obesity, smoking, and sedentary lifestyle greatly increases your risk for illness    A healthy diet, regular physical exercise & weight monitoring are important for maintaining a healthy lifestyle    You may be retaining fluid if you have a history of heart failure or if you experience any of the following symptoms:  Weight gain of 3 pounds or more overnight or 5 pounds in a week, increased swelling in our hands or feet or shortness of breath while lying flat in bed. Please call your doctor as soon as you notice any of these symptoms; do not wait until your next office visit. Recognize signs and symptoms of STROKE:    F-face looks uneven    A-arms unable to move or move even    S-speech slurred or non-existent    T-time-call 911 as soon as signs and symptoms begin-DO NOT go       Back to bed or wait to see if you get better-TIME IS BRAIN. The discharge information has been reviewed with the patient. The patient verbalized understanding. Information obtained by :  I understand that if any problems occur once I am at home I am to contact my physician. I understand and acknowledge receipt of the instructions indicated above. Physician's or R.N.'s Signature                                                                  Date/Time                                                                                                                                              Patient or Representative Signature                                                          Date/Time

## 2020-07-09 NOTE — PROGRESS NOTES
Pt sitting up in recliner. Alert and Oriented x3. Complains of pain 5 out of 10 see MAR. No NV deficits noted. +2 pedal pulses. Surgical bandage clean, dry and intact. Call light within reach.

## 2020-07-10 ENCOUNTER — HOME HEALTH ADMISSION (OUTPATIENT)
Dept: HOME HEALTH SERVICES | Facility: HOME HEALTH | Age: 65
End: 2020-07-10
Payer: COMMERCIAL

## 2020-07-10 ENCOUNTER — PATIENT OUTREACH (OUTPATIENT)
Dept: CASE MANAGEMENT | Age: 65
End: 2020-07-10

## 2020-07-10 NOTE — PROGRESS NOTES
Care Management Interventions  PCP Verified by CM: Yes  Mode of Transport at Discharge: Self  Transition of Care Consult (CM Consult): [address]: Yes  Discharge Durable Medical Equipment: Yes  Physical Therapy Consult: Yes  Occupational Therapy Consult: Yes  Current Support Network: Lives with Spouse  Confirm Follow Up Transport: Family  The Plan for Transition of Care is Related to the Following Treatment Goals : return to independent function  The Patient and/or Patient Representative was Provided with a Choice of Provider and Agrees with the Discharge Plan?: Yes  Freedom of Choice List was Provided with Basic Dialogue that Supports the Patient's Individualized Plan of Care/Goals, Treatment Preferences and Shares the Quality Data Associated with the Providers?: Yes  Discharge Location  Discharge Placement: Home with home health  Patient discharged today to home. She was scheduled to have Cheng Garcia (per request)  [facility] but she just called and was exposed to [employer] so she has to cancel this referral.  I called pt at home and she was aware. She agreed to having another provider and  did not have a preference. Referral sent to [facility].

## 2020-07-10 NOTE — PROGRESS NOTES
Patient contacted regarding recent discharge and COVID-19 risk. Discussed COVID-19 related testing which was available at this time. Test results were negative. Patient informed of results, if available? yes    Care Transition Nurse/ Ambulatory Care Manager contacted the patient by telephone to perform post discharge assessment. Verified name and  with patient as identifiers. Patient has following risk factors of: no known risk factors. CTN/ACM reviewed discharge instructions, medical action plan and red flags related to discharge diagnosis. Reviewed and educated them on any new and changed medications related to discharge diagnosis. Advised obtaining a 90-day supply of all daily and as-needed medications. Education provided regarding infection prevention, and signs and symptoms of COVID-19 and when to seek medical attention with patient who verbalized understanding. Discussed exposure protocols and quarantine from [address] you at higher risk for severe illness  and given an opportunity for questions and concerns. The patient agrees to contact the COVID-19 hotline [phone] or PCP office for questions related to their healthcare. CTN/ACM provided contact information for future reference. From CDC: Are you at higher risk for severe illness?  Wash your hands often.  Avoid close contact (6 feet, which is about two arm lengths) with people who are sick.  Put distance between yourself and other people if COVID-19 is spreading in your community.  Clean and disinfect frequently touched surfaces.  Avoid all cruise travel and non-essential air travel.  Call your healthcare professional if you have concerns about COVID-19 and your underlying condition or if you are sick.     For more information on steps you can take to protect yourself, see CDC's How to Protect Yourself      Patient/family/caregiver given information for Jordy Uribe and agrees to enroll no  Patient's preferred e-mail:  n/a  Patient's preferred phone number: n/a  Based on Loop alert triggers, patient will be contacted by nurse care manager for worsening symptoms. Plan for follow-up call in 7-14 days based on severity of symptoms and risk factors.

## 2020-07-12 ENCOUNTER — HOME CARE VISIT (OUTPATIENT)
Dept: SCHEDULING | Facility: HOME HEALTH | Age: 65
End: 2020-07-12
Payer: COMMERCIAL

## 2020-07-12 PROCEDURE — G0151 HHCP-SERV OF PT,EA 15 MIN: HCPCS

## 2020-07-12 PROCEDURE — 400013 HH SOC

## 2020-07-13 VITALS
RESPIRATION RATE: 20 BRPM | HEART RATE: 72 BPM | SYSTOLIC BLOOD PRESSURE: 110 MMHG | TEMPERATURE: 98.8 F | DIASTOLIC BLOOD PRESSURE: 80 MMHG

## 2020-07-16 ENCOUNTER — HOME CARE VISIT (OUTPATIENT)
Dept: SCHEDULING | Facility: HOME HEALTH | Age: 65
End: 2020-07-16
Payer: COMMERCIAL

## 2020-07-16 VITALS
HEART RATE: 68 BPM | SYSTOLIC BLOOD PRESSURE: 150 MMHG | DIASTOLIC BLOOD PRESSURE: 85 MMHG | RESPIRATION RATE: 16 BRPM | TEMPERATURE: 98.3 F

## 2020-07-16 PROCEDURE — G0151 HHCP-SERV OF PT,EA 15 MIN: HCPCS

## 2020-07-17 ENCOUNTER — HOME CARE VISIT (OUTPATIENT)
Dept: SCHEDULING | Facility: HOME HEALTH | Age: 65
End: 2020-07-17
Payer: COMMERCIAL

## 2020-07-21 ENCOUNTER — HOME CARE VISIT (OUTPATIENT)
Dept: SCHEDULING | Facility: HOME HEALTH | Age: 65
End: 2020-07-21
Payer: COMMERCIAL

## 2020-07-21 VITALS
DIASTOLIC BLOOD PRESSURE: 75 MMHG | HEART RATE: 68 BPM | SYSTOLIC BLOOD PRESSURE: 130 MMHG | RESPIRATION RATE: 16 BRPM | TEMPERATURE: 98.4 F

## 2020-07-21 PROCEDURE — G0151 HHCP-SERV OF PT,EA 15 MIN: HCPCS

## 2020-07-22 ENCOUNTER — HOME CARE VISIT (OUTPATIENT)
Dept: SCHEDULING | Facility: HOME HEALTH | Age: 65
End: 2020-07-22
Payer: COMMERCIAL

## 2020-07-22 PROCEDURE — G0151 HHCP-SERV OF PT,EA 15 MIN: HCPCS

## 2020-07-23 VITALS
TEMPERATURE: 98.1 F | HEART RATE: 72 BPM | DIASTOLIC BLOOD PRESSURE: 80 MMHG | SYSTOLIC BLOOD PRESSURE: 140 MMHG | RESPIRATION RATE: 16 BRPM

## 2020-07-24 ENCOUNTER — PATIENT OUTREACH (OUTPATIENT)
Dept: CASE MANAGEMENT | Age: 65
End: 2020-07-24

## 2020-07-24 NOTE — PROGRESS NOTES
Date/Time:  7/24/2020 9:36 AM  Attempted to reach patient by telephone. Left HIPPA compliant message requesting a return call. Will attempt to reach patient again.

## 2020-07-25 ENCOUNTER — HOME CARE VISIT (OUTPATIENT)
Dept: SCHEDULING | Facility: HOME HEALTH | Age: 65
End: 2020-07-25
Payer: COMMERCIAL

## 2020-07-25 ENCOUNTER — PATIENT OUTREACH (OUTPATIENT)
Dept: CASE MANAGEMENT | Age: 65
End: 2020-07-25

## 2020-07-25 PROCEDURE — G0151 HHCP-SERV OF PT,EA 15 MIN: HCPCS

## 2020-07-26 VITALS
SYSTOLIC BLOOD PRESSURE: 120 MMHG | TEMPERATURE: 98.4 F | RESPIRATION RATE: 16 BRPM | DIASTOLIC BLOOD PRESSURE: 80 MMHG | HEART RATE: 84 BPM

## 2020-07-28 ENCOUNTER — HOME CARE VISIT (OUTPATIENT)
Dept: SCHEDULING | Facility: HOME HEALTH | Age: 65
End: 2020-07-28
Payer: COMMERCIAL

## 2020-07-28 PROCEDURE — A4649 SURGICAL SUPPLIES: HCPCS

## 2020-07-28 PROCEDURE — G0151 HHCP-SERV OF PT,EA 15 MIN: HCPCS

## 2020-07-29 VITALS
DIASTOLIC BLOOD PRESSURE: 75 MMHG | RESPIRATION RATE: 16 BRPM | SYSTOLIC BLOOD PRESSURE: 128 MMHG | TEMPERATURE: 98.4 F | HEART RATE: 76 BPM

## 2020-07-30 ENCOUNTER — HOME CARE VISIT (OUTPATIENT)
Dept: SCHEDULING | Facility: HOME HEALTH | Age: 65
End: 2020-07-30
Payer: COMMERCIAL

## 2020-07-30 VITALS
HEART RATE: 84 BPM | SYSTOLIC BLOOD PRESSURE: 120 MMHG | RESPIRATION RATE: 16 BRPM | TEMPERATURE: 98.4 F | DIASTOLIC BLOOD PRESSURE: 70 MMHG

## 2020-07-30 PROCEDURE — G0151 HHCP-SERV OF PT,EA 15 MIN: HCPCS

## 2020-08-04 ENCOUNTER — HOSPITAL ENCOUNTER (OUTPATIENT)
Dept: PHYSICAL THERAPY | Age: 65
Discharge: HOME | End: 2020-08-04
Payer: COMMERCIAL

## 2020-08-04 PROCEDURE — 97140 MANUAL THERAPY 1/> REGIONS: CPT

## 2020-08-04 PROCEDURE — 97110 THERAPEUTIC EXERCISES: CPT

## 2020-08-04 PROCEDURE — 97162 PT EVAL MOD COMPLEX 30 MIN: CPT

## 2020-08-04 NOTE — PROGRESS NOTES
Natalie Vaca  : 1955  Primary: [facility]*  Secondary:  Therapy Center at [address], [place], [address] [place] [address]  [phone]   [phone]      OUTPATIENT PHYSICAL THERAPY: Daily Treatment Note 2020    ICD-10: Treatment Diagnosis: L30.183 predence of right artificial knee                 Treatment Diagnosis 2: M25.561 pain in joint/right knee                Treatment Diagnosis 3: M25.661 stiffness in joint/right knee   Precautions: squatting  Allergies: Latex; Singulair [montelukast]; Adhesive tape-silicones; Celebrex [celecoxib]; Ketamine; and Pravastatin   TREATMENT PLAN:  Effective Dates: 2020 TO 10/3/2020 (60 days). 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 Required minimal verbal cues to promote proper body alignment and promote proper body posture. Progressed resistance, range, repetitions and complexity of movement as indicated.      Date:  2020 Date:   Date:     Activity/Exercise Parameters Parameters Parameters   Calf stretch 2 x 30 seconds by therapist      Hamstring stretch 2 x 30 seconds with belt      Ankle pumps X 20     Quad sets 2 X 10 with 3 second hold     Gluteal sets X 10 with 3 second hold      Straight leg raise  X 10      Long arc quads 2 x 10 with 2 lbs     Standing hamstring curls 2 x 10 with 2 lbs      Weight shifts 3 x 30 seconds      Sit to stand X 5     Heel slide X 15 with overpressure from therapist              Time spent with patient reviewing proper muscle recruitment and technique with exercises. MANUAL THERAPY: (12 minutes): Soft tissue mobilization was utilized and necessary because of the patient's restricted joint motion and restricted motion of soft tissue   Soft tissue mobilization to distal right quads and popliteal region while supine for tightness (patient very sensitive to touch per patient )    MODALITIES: (13 minutes):      *  Cold Pack Therapy in order to provide analgesia, relieve muscle spasm and reduce inflammation and edema. *  Hot Pack Therapy in order to relieve muscle spasm. Hot pack x 5 minutes to R knee while supine prior to exercises to loosen up musculature and cold pack x 8 minutes to R knee while setated after exercises for inflammation/edema    HEP: As above; handouts given to patient for all exercises. Treatment/Session Summary:    · Response to Treatment:  relief with manual therapy and cold pack . · Communication/Consultation:  HEP  · Equipment provided today:  None today  · Recommendations/Intent for next treatment session: Next visit will focus on knee flexion and gait training and LE conditioning .     Total Treatment Billable Duration:  Manual therapy x 12 minutes/exercises x 25 minutes/evaluation x 20 minutes =57 minutes    Time in -[facility]  Time out-1640     David De La Cruz, PT

## 2020-08-04 NOTE — THERAPY EVALUATION
Adri Jain  : 1955  Primary: [facility]  Secondary:  [facility] at [facility]  [facility], Scot bowen, [address] [place] [address] Street  [phone]   [phone]       OUTPATIENT PHYSICAL THERAPY:Initial Assessment 2020    ICD-10: Treatment Diagnosis: O46.040 presence of right artificial knee joint                Treatment Diagnosis 2: M25.561 pain in joint/right knee                   Treatment Diagnosis 3: M25.661 stiffness in joint/ R knee    Precautions:squatting   Allergies: Latex; Singulair [montelukast]; Adhesive tape-silicones; Celebrex [celecoxib]; Ketamine; and Pravastatin  TREATMENT PLAN:  Effective Dates: 2020 TO 10/3/2020 (60 days). Frequency/Duration: 2 times a week for 60 Day(s) MEDICAL/REFERRING DIAGNOSIS:  Presence of right artificial knee joint [Z96.651]   DATE OF ONSET: 2020- R TKA   REFERRING PHYSICIAN: Santana Guerrero MD MD Orders: Evaluate and Treat   Return MD Appointment: 2020     INITIAL ASSESSMENT:  Ms. Kasia Kevin is a 60 y.o. female presenting to physical therapy with complaints of R knee pain and stiffness with abnormality of gait after receiving a R TKA on 2020-knee pain is 3/10 with noted swelling -knee is warm but not hot-incision looks good -patient ambulates into dept with cane and moderate antalgic gait -decreased R quad strength with pain inhibition and decreased knee flexion-patient has history of L TKA in  --very good candidate for skilled physical therapy to include manual therapy, therapeutic exercises, gait training and cryotherapy     PROBLEM LIST (Impacting functional limitations):  1. Decreased Strength  2. Decreased ADL/Functional Activities  3. Decreased Transfer Abilities  4. Decreased Ambulation Ability/Technique  5. Decreased Balance  6. Increased Pain  7. Decreased Activity Tolerance  8. Decreased Pacing Skills  9. Decreased Flexibility/Joint Mobility  10.  Edema/Girth INTERVENTIONS PLANNED: (Treatment may consist of any combination of the following)  1. Cold  2. Cryotherapy  3. Gait Training  4. Heat  5. Home Exercise Program (HEP)  6. Manual Therapy  7. Range of Motion (ROM)  8. Therapeutic Exercise/Strengthening     GOALS: (Goals have been discussed and agreed upon with patient.)  Short-Term Functional Goals: Time Frame: 8/4/2020 to 8/18/2020  1. Patient demonstrates independence with home exercise program without verbal cueing provided by therapist.  2. Knee pain is less than 3/10 to progress to DC goal   3. Knee flexion is improved by 7 degrees to allow increased in and out of car   4. Ambulate with cane with minimal antalgic gait  5. Swelling at mid patella is decreased by 1/2 inch   6. Quad strength increased to 3+/5 to allow increased ADLs    7. Discharge Goals: Time Frame: 8/4/2020 to 10/3/2020  1. Knee pain is minimal at 1/10 to allow most home ADLs including light lifting   2. Knee range of motion is wfl to allow full personal care including dressing and washing and putting on shoes and socks and driving   3. Independent ambulation with minimal antalgic gait to allow walking community distances   4. No swelling in knee to allow ease with up and down steps and in an out of car   5. Able to sleep at least 5 hours without wakening with knee pain   6. Quad strength is at least 4-/5 to allow increased activities /chores   7. LEFS score is improved from 28/80 to 55/80  8. Outcome Measure: Tool Used: Lower Extremity Functional Scale (LEFS)  Score:  Initial: 28/80 Most Recent: X/80 (Date: -- )   Interpretation of Score: 20 questions each scored on a 5 point scale with 0 representing \"extreme difficulty or unable to perform\" and 4 representing \"no difficulty\". The lower the score, the greater the functional disability. 80/80 represents no disability. Minimal detectable change is 9 points.       Medical Necessity:   · Patient is expected to demonstrate progress in strength, range of motion, balance, coordination and functional technique to increase independence with ADLs and improve safety during walking and transfers. · Skilled intervention continues to be required due to R knee pain and stiffness is affecting gait and function. Reason for Services/Other Comments:  · Patient continues to require modification of therapeutic interventions to increase complexity of exercises. Total Evaluation Duration: 20 minutes    Rehabilitation Potential For Stated Goals: Good  Regarding Caorl Mcdonald's therapy, I certify that the treatment plan above will be carried out by a therapist or under their direction. Thank you for this referral,  Haley Patel PT     Referring Physician Signature: Shelby Holland MD              Date                     PAIN/SUBJECTIVE:    Initial: Pain Intensity 1: 3  Pain Location 1: Knee  Pain Orientation 1: Anterior, Right  Pain Intervention(s) 1: Cold pack, Elevation, Exercise  Post Session: 2/10 -relief with cold pack     HISTORY:    History of Injury/Illness (Reason for Referral):  R TKA on 2020 -knee pain and stiffness with abnormality of gait   Past Medical History/Comorbidities:   Ms. Natalie Hankins  has a past medical history of Arthritis, Asthma, Autoimmune disease ([address] (), Back pain (12/3/2012), Claustrophobia, Depression with anxiety, Elevated liver function tests (2015), GERD (gastroesophageal reflux disease), Hyperlipidemia (2015), Hypertension, Hypothyroidism, Menopause, Morbid obesity ([address] (2015), Oral ulcer (12/3/2012), Osteoarthritis (12/3/2012), Sciatica (12/3/2012), and Vitamin D deficiency (2015). She also has no past medical history of Unspecified adverse effect of anesthesia.   Ms. Natalie Hankins  has a past surgical history that includes hx orthopaedic (Right, 2012); hx breast biopsy (Right, ); hx  section (, ); hx dilation and curettage (2019); colonoscopy (N/A, 2019); hx lymph node dissection (1990); hx rotator cuff repair (Left, 2014); hx knee replacement (Left, 07/2012); and ir inj epidural cerv/thor steroid wo img. Social History/Living Environment:    lives at home with  -does not work outside of home   Prior Level of Function/Work/Activity:  Independent with home ADLs     Ambulatory/Rehab Services H2 Model Falls Risk Assessment    Risk Factors:       (5)  History of Recent Falls [w/in 3 months] Ability to Rise from Chair:       (1)  Pushes up, successful in one attempt    Falls Prevention Plan: Mobility Assistance Device (specify):  needs a cane for ambulation at this time     Total: (5 or greater = High Risk): 6    ©2010 [facility] [address] [place] [address] States Patent #[id number]. Federal Law prohibits the replication, distribution or use without written permission from [facility] [employer]    Current Medications:        Current Outpatient Medications:     acetaminophen (TYLENOL) 500 mg tablet, Take 500 mg by mouth every six (6) hours as needed for Pain., Disp: , Rfl:     aspirin delayed-release 81 mg tablet, Take 1 Tab by mouth every twelve (12) hours for 42 days. , Disp: 84 Tab, Rfl: 0    meloxicam (MOBIC) 7.5 mg tablet, Take 2 Tabs by mouth daily for 30 days. , Disp: 60 Tab, Rfl: 0    polysorbate 80/glycerin (REFRESH DRY EYE THERAPY OP), Apply 1 Drop to eye as needed (dry eyes). , Disp: , Rfl:     albuterol (ProAir HFA) 90 mcg/actuation inhaler, Take 2 Puffs by inhalation every four (4) hours as needed for Wheezing., Disp: 3 Inhaler, Rfl: 3    fluocinolone acetonide oiL 0.01 % drop, 3 Drops by Otic route daily. , Disp: 10 mL, Rfl: 3    mupirocin (Bactroban) 2 % ointment, Apply  to affected area two (2) times a day.  (Patient taking differently: Apply  to affected area as needed.), Disp: 22 g, Rfl: 5    phentermine (Adipex-P) 37.5 mg tablet, Take 37.5 mg by mouth every morning., Disp: , Rfl:     sertraline (ZOLOFT) 50 mg tablet, TAKE 1 TABLET BY MOUTH  DAILY, Disp: 90 Tab, Rfl: 3    potassium chloride (K-DUR, KLOR-CON) 20 mEq tablet, TAKE 1 TABLET BY MOUTH  EVERY MORNING, Disp: 90 Tab, Rfl: 3    levothyroxine (SYNTHROID) 88 mcg tablet, TAKE 1 TABLET BY MOUTH  DAILY, Disp: 90 Tab, Rfl: 3    telmisartan (MICARDIS) 80 mg tablet, Take 1 Tab by mouth daily. , Disp: 90 Tab, Rfl: 3    hydroCHLOROthiazide (HYDRODIURIL) 12.5 mg tablet, Take 1 Tab by mouth daily. , Disp: 90 Tab, Rfl: 3    famotidine (PEPCID) 20 mg tablet, Take 1 Tab by mouth daily. (Patient taking differently: Take 40 mg by mouth daily.), Disp: 90 Tab, Rfl: 3    garlic 5,593 mg cap, Take 1 Tab by mouth daily. , Disp: , Rfl:     fluticasone (FLONASE) 50 mcg/actuation nasal spray, 2 Sprays by Both Nostrils route daily. , Disp: , Rfl:     cholecalciferol, VITAMIN D3, (VITAMIN D3) 5,000 unit tab tablet, Take 1 Tab by mouth daily. , Disp: , Rfl:     Date Last Reviewed:  8/4/2020    Number of Personal Factors/Comorbidities that affect the Plan of Care-history of HTN, GERD, hypothyroidism, and unexplained falls: 1-2: MODERATE COMPLEXITY    EXAMINATION:    Observation/Orthostatic Postural Assessment:          Patient ambulates with cane and moderate antalgic gait with rounded shoulders and forward head on neck posture   Palpation:          Tight R calf and distal quads   ROM:         R knee range of motion is 0-105    L knee range of motion is 0-118  Strength:          Ankles are 4-/5     L quads and hamstrings are 3+/5     R quads and hamstrings are 3/5 with mild pain inhibition    L hip flexor is 4-/5     R hip flexor is 3+ to 4-/5     Special Tests:         Negative homans sign   Neurological Screen:  No radiating pain or numbness or tingling into lower leg   Mental Status:       Alert and oriented   Edema/Girth:    Left Right    Initial Most Recent Initial Most Recent   Upper  Extremity           Lower  Extremity  18.0 at mid patella    19.5 at mid patella        Sensation:      Intact to light touch      Body Structures Involved:  1. Bones  2. Joints  3. Muscles Body Functions Affected:  1. Sensory/Pain  2. Neuromusculoskeletal  3. Movement Related Activities and Participation Affected:  1. Learning and Applying Knowledge  2. General Tasks and Demands  3. Mobility  4. Self Care  5. Domestic Life    Number of elements (examined above) that affect the Plan of Care: 3: MODERATE COMPLEXITY    CLINICAL PRESENTATION:    Presentation: Evolving clinical presentation with changing clinical characteristics: MODERATE COMPLEXITY    CLINICAL DECISION MAKING:    Use of outcome tool(s) and clinical judgement create a POC that gives a-impairment of at least 60% but less than 80%:  Questionable prediction of patient's progress: MODERATE COMPLEXITY

## 2020-08-13 ENCOUNTER — APPOINTMENT (OUTPATIENT)
Dept: PHYSICAL THERAPY | Age: 65
End: 2020-08-13
Payer: COMMERCIAL

## 2020-08-13 NOTE — PROGRESS NOTES
Patient called on 8/12/2020 and reported that she would not be returning to therapy because she followed up with Dr Colton Kay and he agreed for her to hold therapy.     Marcos Tom DPT

## 2020-08-28 ENCOUNTER — HOSPITAL ENCOUNTER (OUTPATIENT)
Dept: MAMMOGRAPHY | Age: 65
Discharge: HOME | End: 2020-08-28
Attending: INTERNAL MEDICINE
Payer: COMMERCIAL

## 2020-08-28 DIAGNOSIS — Z12.31 VISIT FOR SCREENING MAMMOGRAM: ICD-10-CM

## 2020-08-28 PROCEDURE — 77063 BREAST TOMOSYNTHESIS BI: CPT

## 2020-09-08 NOTE — THERAPY DISCHARGE
Yahaira Field  : 1955  Primary: [facility]*  Secondary:  Therapy Center at [address], [facility], [address] [place] [address] Street  [phone]   [phone]       OUTPATIENT PHYSICAL [facility] 2020    ICD-10: Treatment Diagnosis: E98.606 presence of right artificial knee joint                Treatment Diagnosis 2: M25.561 pain in joint/right knee                   Treatment Diagnosis 3: M25.661 stiffness in joint/ R knee    Precautions:squatting   Allergies: Latex; Singulair [montelukast]; Adhesive tape-silicones; Celebrex [celecoxib]; Ketamine; and Pravastatin  TREATMENT PLAN:  Effective Dates: 2020 TO 10/3/2020 (60 days). Frequency/Duration: 2 times a week for 60 Day(s) MEDICAL/REFERRING DIAGNOSIS:  Presence of right artificial knee joint [Z96.651]   DATE OF ONSET: 2020- R TKA   REFERRING PHYSICIAN: Gayle Guerrero MD MD Orders: Evaluate and Treat   Return MD Appointment: 2020     INITIAL ASSESSMENT:  Ms. Asad Flood is a 60 y.o. female presenting to physical therapy with complaints of R knee pain and stiffness with abnormality of gait after receiving a R TKA on 2020-knee pain is 3/10 with noted swelling -knee is warm but not hot-incision looks good -patient ambulates into dept with cane and moderate antalgic gait -decreased R quad strength with pain inhibition and decreased knee flexion-patient has history of L TKA in  --very good candidate for skilled physical therapy to include manual therapy, therapeutic exercises, gait training and cryotherapy      Patient was only seen for her initial evaluation and then did not return for continued rehab-DC to home program at this time    PROBLEM LIST (Impacting functional limitations):  1. Decreased Strength  2. Decreased ADL/Functional Activities  3. Decreased Transfer Abilities  4. Decreased Ambulation Ability/Technique  5. Decreased Balance  6. Increased Pain  7.  Decreased Activity Tolerance  8. Decreased Pacing Skills  9. Decreased Flexibility/Joint Mobility  10. Edema/Girth INTERVENTIONS PLANNED: (Treatment may consist of any combination of the following)  1. Cold  2. Cryotherapy  3. Gait Training  4. Heat  5. Home Exercise Program (HEP)  6. Manual Therapy  7. Range of Motion (ROM)  8. Therapeutic Exercise/Strengthening     GOALS: (Goals have been discussed and agreed upon with patient.)  Short-Term Functional Goals: Time Frame: 8/4/2020 to 8/18/2020  1. Patient demonstrates independence with home exercise program without verbal cueing provided by therapist.  2. Knee pain is less than 3/10 to progress to DC goal   3. Knee flexion is improved by 7 degrees to allow increased in and out of car   4. Ambulate with cane with minimal antalgic gait  5. Swelling at mid patella is decreased by 1/2 inch   6. Quad strength increased to 3+/5 to allow increased ADLs    7. Discharge Goals: Time Frame: 8/4/2020 to 10/3/2020  1. Knee pain is minimal at 1/10 to allow most home ADLs including light lifting   2. Knee range of motion is wfl to allow full personal care including dressing and washing and putting on shoes and socks and driving   3. Independent ambulation with minimal antalgic gait to allow walking community distances   4. No swelling in knee to allow ease with up and down steps and in an out of car   5. Able to sleep at least 5 hours without wakening with knee pain   6. Quad strength is at least 4-/5 to allow increased activities /chores   7. LEFS score is improved from 28/80 to 55/80    GOALS NOT MET AS PATIENT WAS ONLY SEEN FOR HER INITIAL EVALUATION    Outcome Measure: Tool Used: Lower Extremity Functional Scale (LEFS)  Score:  Initial: 28/80 Most Recent: X/80 (Date: -- )   Interpretation of Score: 20 questions each scored on a 5 point scale with 0 representing \"extreme difficulty or unable to perform\" and 4 representing \"no difficulty\".   The lower the score, the greater the functional disability. 80/80 represents no disability. Minimal detectable change is 9 points. Observation/Orthostatic Postural Assessment:          Patient ambulates with cane and moderate antalgic gait with rounded shoulders and forward head on neck posture   Palpation:          Tight R calf and distal quads   ROM:         R knee range of motion is 0-105    L knee range of motion is 0-118  Strength: Ankles are 4-/5     L quads and hamstrings are 3+/5     R quads and hamstrings are 3/5 with mild pain inhibition    L hip flexor is 4-/5     R hip flexor is 3+ to 4-/5     Special Tests:         Negative homans sign   Neurological Screen:  No radiating pain or numbness or tingling into lower leg   Mental Status:       Alert and oriented   Edema/Girth:    Left Right    Initial Most Recent Initial Most Recent   Upper  Extremity           Lower  Extremity  18.0 at mid patella    19.5 at mid patella        Sensation:      Intact to light touch         Medical Necessity:   · DC to home program  Reason for Services/Other Comments:  · DC to home exercise program      Rehabilitation Potential For Stated Goals: Good  Regarding Sarah Mcdonald's therapy, I certify that the treatment plan above will be carried out by a therapist or under their direction. Thank you for this referral,  Alisha Isidro PT                  PAIN/SUBJECTIVE:                                                                 Body Structures Involved:  1. Bones  2. Joints  3. Muscles Body Functions Affected:  1. Sensory/Pain  2. Neuromusculoskeletal  3. Movement Related Activities and Participation Affected:  1. Learning and Applying Knowledge  2. General Tasks and Demands  3. Mobility  4. Self Care  5.  Domestic Life    Number of elements (examined above) that affect the Plan of Care: 3: MODERATE COMPLEXITY    CLINICAL PRESENTATION:    Presentation: Evolving clinical presentation with changing clinical characteristics: MODERATE COMPLEXITY CLINICAL DECISION MAKING:    Use of outcome tool(s) and clinical judgement create a POC that gives a-impairment of at least 60% but less than 80%:  Questionable prediction of patient's progress: MODERATE COMPLEXITY

## 2020-12-09 PROBLEM — M79.602 PAIN IN BOTH UPPER EXTREMITIES: Status: ACTIVE | Noted: 2020-12-09

## 2020-12-09 PROBLEM — M62.838 MUSCLE SPASM: Status: ACTIVE | Noted: 2020-12-09

## 2020-12-09 PROBLEM — M79.601 PAIN IN BOTH UPPER EXTREMITIES: Status: ACTIVE | Noted: 2020-12-09

## 2020-12-09 PROBLEM — R94.02 ABNORMAL BRAIN SCAN: Status: ACTIVE | Noted: 2020-12-09

## 2021-04-20 PROBLEM — M79.2 NEUROPATHIC PAIN: Status: ACTIVE | Noted: 2021-04-20

## 2021-05-27 PROBLEM — M79.603 PAIN OF UPPER EXTREMITY: Status: ACTIVE | Noted: 2021-05-27

## 2021-09-08 ENCOUNTER — TRANSCRIBE ORDER (OUTPATIENT)
Dept: SCHEDULING | Age: 66
End: 2021-09-08

## 2021-09-08 DIAGNOSIS — Z12.31 VISIT FOR SCREENING MAMMOGRAM: Primary | ICD-10-CM

## 2021-10-01 ENCOUNTER — HOSPITAL ENCOUNTER (OUTPATIENT)
Dept: MAMMOGRAPHY | Age: 66
Discharge: HOME | End: 2021-10-01
Attending: OBSTETRICS & GYNECOLOGY
Payer: MEDICARE

## 2021-10-01 DIAGNOSIS — Z12.31 VISIT FOR SCREENING MAMMOGRAM: ICD-10-CM

## 2021-10-01 PROCEDURE — 77063 BREAST TOMOSYNTHESIS BI: CPT

## 2022-03-18 PROBLEM — Z96.651 STATUS POST RIGHT KNEE REPLACEMENT: Status: ACTIVE | Noted: 2020-07-08

## 2022-03-19 PROBLEM — M79.2 NEUROPATHIC PAIN: Status: ACTIVE | Noted: 2021-04-20

## 2022-03-19 PROBLEM — R29.818 SUSPECTED SLEEP APNEA: Status: ACTIVE | Noted: 2020-06-17

## 2022-03-19 PROBLEM — J45.20 MILD INTERMITTENT ASTHMA WITHOUT COMPLICATION: Status: ACTIVE | Noted: 2019-12-20

## 2022-03-19 PROBLEM — M79.602 PAIN IN BOTH UPPER EXTREMITIES: Status: ACTIVE | Noted: 2020-12-09

## 2022-03-19 PROBLEM — M79.603 PAIN OF UPPER EXTREMITY: Status: ACTIVE | Noted: 2021-05-27

## 2022-03-19 PROBLEM — M79.601 PAIN IN BOTH UPPER EXTREMITIES: Status: ACTIVE | Noted: 2020-12-09

## 2022-03-19 PROBLEM — R94.02 ABNORMAL BRAIN SCAN: Status: ACTIVE | Noted: 2020-12-09

## 2022-03-20 PROBLEM — E66.01 OBESITY, MORBID (HCC): Status: ACTIVE | Noted: 2019-10-02

## 2022-03-20 PROBLEM — M62.838 MUSCLE SPASM: Status: ACTIVE | Noted: 2020-12-09

## 2023-11-08 ENCOUNTER — HOSPITAL ENCOUNTER (OUTPATIENT)
Dept: MAMMOGRAPHY | Age: 68
Discharge: HOME | End: 2023-11-11
Payer: MEDICARE

## 2023-11-08 VITALS — HEIGHT: 61 IN | WEIGHT: 248 LBS | BODY MASS INDEX: 46.82 KG/M2

## 2023-11-08 DIAGNOSIS — Z12.31 VISIT FOR SCREENING MAMMOGRAM: ICD-10-CM

## 2023-11-08 PROCEDURE — 77063 BREAST TOMOSYNTHESIS BI: CPT

## 2025-08-27 ENCOUNTER — HOSPITAL ENCOUNTER (OUTPATIENT)
Dept: MAMMOGRAPHY | Age: 70
Discharge: HOME | End: 2025-08-30
Payer: MEDICARE

## 2025-08-27 DIAGNOSIS — Z12.31 SCREENING MAMMOGRAM FOR BREAST CANCER: ICD-10-CM

## 2025-08-27 PROCEDURE — 77063 BREAST TOMOSYNTHESIS BI: CPT

## (undated) DEVICE — KIT DRP FOR RIO ROBOTIC ARM ASST SYS

## (undated) DEVICE — CONNECTOR TBNG OD5-7MM O2 END DISP

## (undated) DEVICE — Z DISCONTINUED USE 2744636  DRESSING AQUACEL 14 IN ALG W3.5XL14IN POLYUR FLM CVR W/ HYDRCOLL

## (undated) DEVICE — SUTURE ABSRB X-1 REV CUT 1/2 CIR 22MM UD BRAID 27IN SZ 3-0 J458H

## (undated) DEVICE — KIT PROC KNE TRACKING PK/1 -- VIZADISC MAKO

## (undated) DEVICE — X-LARGE COTTON GLOVE: Brand: DEROYAL

## (undated) DEVICE — PIN BNE 4X110MM STRL -- 2/PK MAKO

## (undated) DEVICE — NDL PRT INJ NSAF BLNT 18GX1.5 --

## (undated) DEVICE — KIT INT FIX FEM TIB CKPT MAKOPLASTY

## (undated) DEVICE — T4 HOOD

## (undated) DEVICE — BLADE SURG SAW STD S STL OSC W/ SERR EDGE DISP

## (undated) DEVICE — SOLUTION IRRIG 3000ML 0.9% SOD CHL FLX CONT 0797208] ICU MEDICAL INC]

## (undated) DEVICE — PIN BNE FIX 4X140MM STRL -- 1EA=2PK MAKO

## (undated) DEVICE — TRAY PREP DRY W/ PREM GLV 2 APPL 6 SPNG 2 UNDPD 1 OVERWRAP

## (undated) DEVICE — SOLUTION IV 1000ML 0.9% SOD CHL

## (undated) DEVICE — CLIP HEMO ENDOSCP 235CM BX/10 -- RESOLUTION 360

## (undated) DEVICE — STERILE PRESSURE PROTECTOR PAD® FOR DE MAYO UNIVERSAL DISTRACTOR® (10/CASE): Brand: DE MAYO UNIVERSAL DISTRACTOR®

## (undated) DEVICE — MEDI-VAC YANKAUER SUCTION HANDLE W/BULBOUS TIP: Brand: CARDINAL HEALTH

## (undated) DEVICE — 3M™ STERI-DRAPE™ INSTRUMENT POUCH 1018: Brand: STERI-DRAPE™

## (undated) DEVICE — SYR 3ML LL TIP 1/10ML GRAD --

## (undated) DEVICE — (D)PREP SKN CHLRAPRP APPL 26ML -- CONVERT TO ITEM 371833

## (undated) DEVICE — TOTAL KNEE DR JENNINGS: Brand: MEDLINE INDUSTRIES, INC.

## (undated) DEVICE — Device

## (undated) DEVICE — SUTURE VCRL SZ 1 L27IN ABSRB UD L36MM CP-1 1/2 CIR REV CUT J268H

## (undated) DEVICE — REM POLYHESIVE ADULT PATIENT RETURN ELECTRODE: Brand: VALLEYLAB

## (undated) DEVICE — SYR 50ML LR LCK 1ML GRAD NSAF --

## (undated) DEVICE — 1200 GUARD II KIT W/5MM TUBE W/O VAC TUBE: Brand: GUARDIAN

## (undated) DEVICE — SUTURE 2 0 STRATAFIX SYMMETRIC PDS + 60CM CT 1 SXPP1A439

## (undated) DEVICE — CONTAINER PREFIL FRMLN 40ML --

## (undated) DEVICE — KENDALL RADIOLUCENT FOAM MONITORING ELECTRODE RECTANGULAR SHAPE: Brand: KENDALL

## (undated) DEVICE — STAPLER SKIN L39MM DIA0.53MM CRWN 5.7MM S STL FIX HD PROX

## (undated) DEVICE — SYR 5ML 1/5 GRAD LL NSAF LF --

## (undated) DEVICE — SOLUTION IV 250ML 0.9% SOD CHL CLR INJ FLX BG CONT PRT CLSR

## (undated) DEVICE — DRAPE,TOP,102X53,STERILE: Brand: MEDLINE

## (undated) DEVICE — DISPOSABLE DRAPE, STERILE, FOR A CDS-3060 5 FOOT TABLE: Brand: PEDIGO PRODUCTS, INC.

## (undated) DEVICE — SUTURE PDS II SZ 1 L96IN ABSRB VLT TP-1 L65MM 1/2 CIR Z880G

## (undated) DEVICE — CANNULA NSL ORAL AD FOR CAPNOFLEX CO2 O2 AIRLFE

## (undated) DEVICE — CORD RETRCT SIL

## (undated) DEVICE — SNARE POLYP SM W13MMXL240CM SHTH DIA2.4MM OVL FLX DISP

## (undated) DEVICE — SUT ETHBND 2 30IN LR DA GRN --

## (undated) DEVICE — HANDPIECE SET WITH COAXIAL HIGH FLOW TIP AND SUCTION TUBE: Brand: INTERPULSE

## (undated) DEVICE — BIPOLAR SEALER 23-112-1 AQM 6.0: Brand: AQUAMANTYS ®